# Patient Record
Sex: FEMALE | Race: ASIAN | Employment: FULL TIME | URBAN - METROPOLITAN AREA
[De-identification: names, ages, dates, MRNs, and addresses within clinical notes are randomized per-mention and may not be internally consistent; named-entity substitution may affect disease eponyms.]

---

## 2017-02-08 ENCOUNTER — OFFICE VISIT (OUTPATIENT)
Dept: INTERNAL MEDICINE CLINIC | Facility: CLINIC | Age: 32
End: 2017-02-08

## 2017-02-08 VITALS
HEART RATE: 74 BPM | DIASTOLIC BLOOD PRESSURE: 72 MMHG | TEMPERATURE: 99 F | OXYGEN SATURATION: 99 % | WEIGHT: 101.5 LBS | SYSTOLIC BLOOD PRESSURE: 122 MMHG | BODY MASS INDEX: 20 KG/M2

## 2017-02-08 DIAGNOSIS — I10 BENIGN ESSENTIAL HYPERTENSION: Primary | ICD-10-CM

## 2017-02-08 DIAGNOSIS — R00.2 PALPITATIONS: ICD-10-CM

## 2017-02-08 DIAGNOSIS — R51.9 ACUTE NONINTRACTABLE HEADACHE, UNSPECIFIED HEADACHE TYPE: ICD-10-CM

## 2017-02-08 PROCEDURE — 99214 OFFICE O/P EST MOD 30 MIN: CPT | Performed by: FAMILY MEDICINE

## 2017-02-08 NOTE — PROGRESS NOTES
HPI:    Patient ID: Andrea Ham is a 27year old female.     HPI  Having flareup of her migraine HA she believes    Most of these HA are prementrual and in the temples but this is R sided above the R eye area   4d now   Throbbing    BP has been up as normal reflexes. No cranial nerve deficit. She exhibits normal muscle tone. Coordination normal.   Skin: She is not diaphoretic. Psychiatric: She has a normal mood and affect. Vitals reviewed.              ASSESSMENT/PLAN:   Benign essential hypertensio

## 2017-03-21 RX ORDER — HYDROCHLOROTHIAZIDE 12.5 MG/1
TABLET ORAL
Qty: 30 TABLET | Refills: 2 | Status: SHIPPED | OUTPATIENT
Start: 2017-03-21 | End: 2017-05-28

## 2017-05-30 RX ORDER — HYDROCHLOROTHIAZIDE 12.5 MG/1
TABLET ORAL
Qty: 90 TABLET | Refills: 2 | Status: SHIPPED | OUTPATIENT
Start: 2017-05-30 | End: 2017-09-28

## 2017-08-05 RX ORDER — HYDROCHLOROTHIAZIDE 12.5 MG/1
TABLET ORAL
Qty: 30 TABLET | Refills: 0 | Status: SHIPPED | OUTPATIENT
Start: 2017-08-05 | End: 2017-08-14

## 2017-08-14 ENCOUNTER — OFFICE VISIT (OUTPATIENT)
Dept: INTERNAL MEDICINE CLINIC | Facility: CLINIC | Age: 32
End: 2017-08-14

## 2017-08-14 VITALS
OXYGEN SATURATION: 99 % | DIASTOLIC BLOOD PRESSURE: 90 MMHG | BODY MASS INDEX: 20.78 KG/M2 | HEART RATE: 64 BPM | TEMPERATURE: 98 F | HEIGHT: 58 IN | WEIGHT: 99 LBS | SYSTOLIC BLOOD PRESSURE: 144 MMHG

## 2017-08-14 DIAGNOSIS — Z00.00 LABORATORY EXAM ORDERED AS PART OF ROUTINE GENERAL MEDICAL EXAMINATION: ICD-10-CM

## 2017-08-14 DIAGNOSIS — I10 BENIGN ESSENTIAL HYPERTENSION: Primary | ICD-10-CM

## 2017-08-14 DIAGNOSIS — F41.9 ANXIETY: ICD-10-CM

## 2017-08-14 DIAGNOSIS — R00.2 PALPITATIONS: ICD-10-CM

## 2017-08-14 PROCEDURE — 99214 OFFICE O/P EST MOD 30 MIN: CPT | Performed by: PHYSICIAN ASSISTANT

## 2017-08-14 RX ORDER — METOPROLOL SUCCINATE 25 MG/1
25 TABLET, EXTENDED RELEASE ORAL DAILY
Qty: 30 TABLET | Refills: 0 | Status: SHIPPED | OUTPATIENT
Start: 2017-08-14 | End: 2017-09-21

## 2017-08-14 NOTE — PROGRESS NOTES
Jose F Hernandez is a 27year old female. HPI:   Patient presents for f/u of HTN. Compliant with meds, no SEs.  C/o palpitations 1-2 times a month. Seems to correlate with stress and only occurs at night.   Gets anxious when this happens and then fee status: Former Smoker                                                              Packs/day: 0.10      Years: 3.00         Types: Cigarettes  Comment: 2-3 cigarettes when out on weekends  Alcohol use:  Yes              Comment: occ wine        Family Histo counseling at this time, will cont to monitor. The patient indicates understanding of these issues and agrees to the plan. The patient is asked to return in 1 month for BP check.

## 2017-08-14 NOTE — PATIENT INSTRUCTIONS
High Blood Pressure:  - continue hydrochlorothiazide 12.5 mg - 1 tablet daily  - start metoprolol 15 mg - 1 tablet daily    Please do your lab work ASAP. Remember to fast for 10-12 hours but drink plenty of water.     Regular meals throughout the day focus

## 2017-08-15 RX ORDER — METOPROLOL SUCCINATE 25 MG/1
TABLET, EXTENDED RELEASE ORAL
Qty: 90 TABLET | Refills: 0 | OUTPATIENT
Start: 2017-08-15

## 2017-09-26 RX ORDER — METOPROLOL SUCCINATE 25 MG/1
TABLET, EXTENDED RELEASE ORAL
Qty: 30 TABLET | Refills: 0 | Status: SHIPPED | OUTPATIENT
Start: 2017-09-26 | End: 2017-09-26

## 2017-09-26 RX ORDER — METOPROLOL SUCCINATE 25 MG/1
TABLET, EXTENDED RELEASE ORAL
Qty: 30 TABLET | Refills: 0 | Status: SHIPPED | OUTPATIENT
Start: 2017-09-26 | End: 2017-09-28

## 2017-09-28 ENCOUNTER — OFFICE VISIT (OUTPATIENT)
Dept: INTERNAL MEDICINE CLINIC | Facility: CLINIC | Age: 32
End: 2017-09-28

## 2017-09-28 ENCOUNTER — LAB ENCOUNTER (OUTPATIENT)
Dept: LAB | Age: 32
End: 2017-09-28
Attending: PHYSICIAN ASSISTANT
Payer: COMMERCIAL

## 2017-09-28 VITALS
TEMPERATURE: 99 F | SYSTOLIC BLOOD PRESSURE: 110 MMHG | HEIGHT: 58 IN | HEART RATE: 66 BPM | OXYGEN SATURATION: 97 % | RESPIRATION RATE: 15 BRPM | BODY MASS INDEX: 20.78 KG/M2 | DIASTOLIC BLOOD PRESSURE: 66 MMHG | WEIGHT: 99 LBS

## 2017-09-28 DIAGNOSIS — R00.2 PALPITATIONS: ICD-10-CM

## 2017-09-28 DIAGNOSIS — I10 BENIGN ESSENTIAL HYPERTENSION: ICD-10-CM

## 2017-09-28 DIAGNOSIS — Z00.00 LABORATORY EXAM ORDERED AS PART OF ROUTINE GENERAL MEDICAL EXAMINATION: ICD-10-CM

## 2017-09-28 DIAGNOSIS — Z00.00 ANNUAL PHYSICAL EXAM: Primary | ICD-10-CM

## 2017-09-28 LAB
BASOPHILS # BLD AUTO: 0.03 X10(3) UL (ref 0–0.1)
BASOPHILS NFR BLD AUTO: 0.5 %
BUN BLD-MCNC: 11 MG/DL (ref 8–20)
CALCIUM BLD-MCNC: 8.8 MG/DL (ref 8.3–10.3)
CHLORIDE: 104 MMOL/L (ref 101–111)
CHOLEST SMN-MCNC: 186 MG/DL (ref ?–200)
CO2: 25 MMOL/L (ref 22–32)
CREAT BLD-MCNC: 0.83 MG/DL (ref 0.55–1.02)
EOSINOPHIL # BLD AUTO: 0.1 X10(3) UL (ref 0–0.3)
EOSINOPHIL NFR BLD AUTO: 1.6 %
ERYTHROCYTE [DISTWIDTH] IN BLOOD BY AUTOMATED COUNT: 12.2 % (ref 11.5–16)
EST. AVERAGE GLUCOSE BLD GHB EST-MCNC: 100 MG/DL (ref 68–126)
GLUCOSE BLD-MCNC: 81 MG/DL (ref 70–99)
HBA1C MFR BLD HPLC: 5.1 % (ref ?–5.7)
HCT VFR BLD AUTO: 41.1 % (ref 34–50)
HDLC SERPL-MCNC: 100 MG/DL (ref 45–?)
HDLC SERPL: 1.86 {RATIO} (ref ?–4.44)
HGB BLD-MCNC: 13.9 G/DL (ref 12–16)
IMMATURE GRANULOCYTE COUNT: 0.01 X10(3) UL (ref 0–1)
IMMATURE GRANULOCYTE RATIO %: 0.2 %
LDLC SERPL CALC-MCNC: 79 MG/DL (ref ?–130)
LDLC SERPL-MCNC: 7 MG/DL (ref 5–40)
LYMPHOCYTES # BLD AUTO: 2.59 X10(3) UL (ref 0.9–4)
LYMPHOCYTES NFR BLD AUTO: 42.2 %
MCH RBC QN AUTO: 32.6 PG (ref 27–33.2)
MCHC RBC AUTO-ENTMCNC: 33.8 G/DL (ref 31–37)
MCV RBC AUTO: 96.3 FL (ref 81–100)
MONOCYTES # BLD AUTO: 0.56 X10(3) UL (ref 0.1–0.6)
MONOCYTES NFR BLD AUTO: 9.1 %
NEUTROPHIL ABS PRELIM: 2.85 X10 (3) UL (ref 1.3–6.7)
NEUTROPHILS # BLD AUTO: 2.85 X10(3) UL (ref 1.3–6.7)
NEUTROPHILS NFR BLD AUTO: 46.4 %
NONHDLC SERPL-MCNC: 86 MG/DL (ref ?–130)
PLATELET # BLD AUTO: 276 10(3)UL (ref 150–450)
POTASSIUM SERPL-SCNC: 3.9 MMOL/L (ref 3.6–5.1)
RBC # BLD AUTO: 4.27 X10(6)UL (ref 3.8–5.1)
RED CELL DISTRIBUTION WIDTH-SD: 43.2 FL (ref 35.1–46.3)
SODIUM SERPL-SCNC: 137 MMOL/L (ref 136–144)
TRIGLYCERIDES: 35 MG/DL (ref ?–150)
TSI SER-ACNC: 1.11 MIU/ML (ref 0.35–5.5)
WBC # BLD AUTO: 6.1 X10(3) UL (ref 4–13)

## 2017-09-28 PROCEDURE — 99213 OFFICE O/P EST LOW 20 MIN: CPT | Performed by: PHYSICIAN ASSISTANT

## 2017-09-28 PROCEDURE — 85025 COMPLETE CBC W/AUTO DIFF WBC: CPT

## 2017-09-28 PROCEDURE — 80048 BASIC METABOLIC PNL TOTAL CA: CPT

## 2017-09-28 PROCEDURE — 83036 HEMOGLOBIN GLYCOSYLATED A1C: CPT

## 2017-09-28 PROCEDURE — 80061 LIPID PANEL: CPT

## 2017-09-28 PROCEDURE — 84443 ASSAY THYROID STIM HORMONE: CPT

## 2017-09-28 PROCEDURE — 99395 PREV VISIT EST AGE 18-39: CPT | Performed by: PHYSICIAN ASSISTANT

## 2017-09-28 PROCEDURE — 36415 COLL VENOUS BLD VENIPUNCTURE: CPT

## 2017-09-28 RX ORDER — HYDROCHLOROTHIAZIDE 12.5 MG/1
12.5 TABLET ORAL
Qty: 90 TABLET | Refills: 3 | Status: SHIPPED | OUTPATIENT
Start: 2017-09-28 | End: 2018-04-26 | Stop reason: ALTCHOICE

## 2017-09-28 NOTE — PROGRESS NOTES
José Luis Benjamin is a 27year old female who presents for a complete physical exam.   HPI:   Pt presents for annual wellness screening AND f/u of HTN. Ran out of metoprolol 3 days ago. Home BP readings in the 110s/60-70s.   Denies headaches, dizziness, L History reviewed. No pertinent surgical history.    Family History   Problem Relation Age of Onset   • Other Father      age 46   • Hypertension Father    • Lipids Mother    • Psychiatric Mother      anxiety   • Hypertension Mother    • Other Mother clear, TMs clear and flat, MMM, OP clear, turbinates normal  NECK: supple, no lymphadenopathy, no thyromegaly  LUNGS: regular breathing rate and effort, clear to auscultation bilaterally  CARDIO: RRR, normal S1 & S2, no murmur  GI: soft, non-tender, non-di

## 2017-09-28 NOTE — PATIENT INSTRUCTIONS
Blood Pressure:  - continue hydrochlorothiazide 12.5 mg daily    Start an over the counter calcium (1,000-1,200 mg) and vit D (1,000-2,000 IU) supplement daily. Please see Felisha Nicholson once you are trying to conceive to change your BP medication.

## 2017-11-20 ENCOUNTER — CHARTING TRANS (OUTPATIENT)
Dept: OTHER | Age: 32
End: 2017-11-20

## 2017-11-22 ENCOUNTER — CHARTING TRANS (OUTPATIENT)
Dept: OTHER | Age: 32
End: 2017-11-22

## 2018-04-24 ENCOUNTER — APPOINTMENT (OUTPATIENT)
Dept: LAB | Age: 33
End: 2018-04-24
Attending: OBSTETRICS & GYNECOLOGY
Payer: COMMERCIAL

## 2018-04-24 ENCOUNTER — OFFICE VISIT (OUTPATIENT)
Dept: OBGYN CLINIC | Facility: CLINIC | Age: 33
End: 2018-04-24

## 2018-04-24 VITALS
WEIGHT: 105 LBS | HEART RATE: 78 BPM | SYSTOLIC BLOOD PRESSURE: 135 MMHG | BODY MASS INDEX: 22.04 KG/M2 | DIASTOLIC BLOOD PRESSURE: 88 MMHG | HEIGHT: 58 IN

## 2018-04-24 DIAGNOSIS — Z12.4 SCREENING FOR MALIGNANT NEOPLASM OF CERVIX: ICD-10-CM

## 2018-04-24 DIAGNOSIS — I10 BENIGN ESSENTIAL HTN: ICD-10-CM

## 2018-04-24 DIAGNOSIS — Z31.69 ENCOUNTER FOR PRECONCEPTION CONSULTATION: ICD-10-CM

## 2018-04-24 DIAGNOSIS — Z01.419 ENCOUNTER FOR GYNECOLOGICAL EXAMINATION WITHOUT ABNORMAL FINDING: Primary | ICD-10-CM

## 2018-04-24 PROCEDURE — 86762 RUBELLA ANTIBODY: CPT | Performed by: OBSTETRICS & GYNECOLOGY

## 2018-04-24 PROCEDURE — 99385 PREV VISIT NEW AGE 18-39: CPT | Performed by: OBSTETRICS & GYNECOLOGY

## 2018-04-24 PROCEDURE — 36415 COLL VENOUS BLD VENIPUNCTURE: CPT | Performed by: OBSTETRICS & GYNECOLOGY

## 2018-04-24 PROCEDURE — 99212 OFFICE O/P EST SF 10 MIN: CPT | Performed by: OBSTETRICS & GYNECOLOGY

## 2018-04-24 RX ORDER — PRENATAL VIT/IRON FUM/FOLIC AC 27 MG-1 MG
TABLET ORAL
COMMUNITY

## 2018-04-24 NOTE — PROGRESS NOTES
HPI:    Patient ID: Halie Shaw is a 34year old female. HPI Nulligravida with menses q 28d / 7d / painful x 3-4 days and withdrawal for [facility]. Stopped OC in Dec 2015 with HTN diagnosis. Tried ACE inhibitor with cough. Then Metoprolol and now only 12. 5 IBUPROFEN 1 tablet by mouth as needed.  Disp:  Rfl:      Allergies:  Calamari Oil [Squid*        Comment:rash  Lisinopril              Coughing  Losartan                Coughing   PHYSICAL EXAM:   Physical Exam   Constitutional: She appears well-developed Imaging & Referrals:  None       #[id number]

## 2018-04-25 ENCOUNTER — TELEPHONE (OUTPATIENT)
Dept: INTERNAL MEDICINE CLINIC | Facility: CLINIC | Age: 33
End: 2018-04-25

## 2018-04-26 RX ORDER — LABETALOL 100 MG/1
TABLET, FILM COATED ORAL
Qty: 180 TABLET | Refills: 0 | OUTPATIENT
Start: 2018-04-26

## 2018-04-26 RX ORDER — LABETALOL 100 MG/1
100 TABLET, FILM COATED ORAL 2 TIMES DAILY
Qty: 60 TABLET | Refills: 0 | Status: SHIPPED | OUTPATIENT
Start: 2018-04-26 | End: 2018-06-18

## 2018-05-17 ENCOUNTER — TELEPHONE (OUTPATIENT)
Dept: INTERNAL MEDICINE CLINIC | Facility: CLINIC | Age: 33
End: 2018-05-17

## 2018-06-11 ENCOUNTER — TELEPHONE (OUTPATIENT)
Dept: OBGYN CLINIC | Facility: CLINIC | Age: 33
End: 2018-06-11

## 2018-06-11 NOTE — TELEPHONE ENCOUNTER
Pt confirms +HPT and LMP 5/8. Pt is taking a PNV. She is aware she will see all 6 MDs and the first appt is with a nurse. OBN scheduled, pt will arrive ten min early for uhcg.  Pt had several questions regarding working out and other limitations during preg

## 2018-06-18 ENCOUNTER — OFFICE VISIT (OUTPATIENT)
Dept: INTERNAL MEDICINE CLINIC | Facility: CLINIC | Age: 33
End: 2018-06-18

## 2018-06-18 ENCOUNTER — TELEPHONE (OUTPATIENT)
Dept: OBGYN CLINIC | Facility: CLINIC | Age: 33
End: 2018-06-18

## 2018-06-18 VITALS
HEART RATE: 87 BPM | BODY MASS INDEX: 21.78 KG/M2 | DIASTOLIC BLOOD PRESSURE: 68 MMHG | HEIGHT: 58 IN | OXYGEN SATURATION: 99 % | TEMPERATURE: 99 F | SYSTOLIC BLOOD PRESSURE: 100 MMHG | WEIGHT: 103.75 LBS | RESPIRATION RATE: 16 BRPM

## 2018-06-18 DIAGNOSIS — J01.90 ACUTE SINUSITIS, RECURRENCE NOT SPECIFIED, UNSPECIFIED LOCATION: ICD-10-CM

## 2018-06-18 DIAGNOSIS — R42 LIGHTHEADEDNESS: ICD-10-CM

## 2018-06-18 DIAGNOSIS — I10 BENIGN ESSENTIAL HYPERTENSION: ICD-10-CM

## 2018-06-18 DIAGNOSIS — Z3A.01 LESS THAN 8 WEEKS GESTATION OF PREGNANCY: ICD-10-CM

## 2018-06-18 DIAGNOSIS — J02.9 PHARYNGITIS, UNSPECIFIED ETIOLOGY: Primary | ICD-10-CM

## 2018-06-18 PROCEDURE — 87880 STREP A ASSAY W/OPTIC: CPT | Performed by: PHYSICIAN ASSISTANT

## 2018-06-18 PROCEDURE — 99214 OFFICE O/P EST MOD 30 MIN: CPT | Performed by: PHYSICIAN ASSISTANT

## 2018-06-18 RX ORDER — LABETALOL 100 MG/1
50 TABLET, FILM COATED ORAL 2 TIMES DAILY
Qty: 90 TABLET | Refills: 0 | Status: SHIPPED | OUTPATIENT
Start: 2018-06-18 | End: 2018-09-09

## 2018-06-18 NOTE — PROGRESS NOTES
HPI:  Leighann Whyte is a 34year old female who presents for URI symptoms x 2 days. Notes that she is currently pregnant (5w6d based on LMP). C/o body aches, sore throat, ear pain, nasal congestion and drainage, headaches.   Denies fever when out on weekends  Alcohol use:  Yes              Comment: occ wine       REVIEW OF SYSTEMS:  GENERAL: feels well otherwise  SKIN: no rashes  HEENT: per HPI  LUNGS: per HPI  CARDIOVASCULAR: denies chest pain  GI: per HPI  : denies dysuria  NEURO: denie

## 2018-06-18 NOTE — TELEPHONE ENCOUNTER
LMP: 5/8, Pt states that she was around her niece who was sick, now has swollen tonsils, congested and unsure what she can take. NURSE ED: unable to make appt due to insurance, states that new ins will become effective 7/1 but schedule is not open.

## 2018-06-18 NOTE — PATIENT INSTRUCTIONS
Sore Throat / Sinus Symptoms:  - may take tylenol (acetaminophen) 1,000 mg every 8 hours as needed (do not exceed 3,000 mg daily)  - salt water gargles, throat lozenges, chloraseptic spray as needed  - saline spray (2 sprays in each nostril several times a

## 2018-07-02 ENCOUNTER — APPOINTMENT (OUTPATIENT)
Dept: LAB | Facility: HOSPITAL | Age: 33
End: 2018-07-02
Attending: OBSTETRICS & GYNECOLOGY
Payer: COMMERCIAL

## 2018-07-02 ENCOUNTER — NURSE ONLY (OUTPATIENT)
Dept: OBGYN CLINIC | Facility: CLINIC | Age: 33
End: 2018-07-02

## 2018-07-02 ENCOUNTER — TELEPHONE (OUTPATIENT)
Dept: OBGYN CLINIC | Facility: CLINIC | Age: 33
End: 2018-07-02

## 2018-07-02 VITALS — HEIGHT: 59.8 IN | WEIGHT: 99.19 LBS | BODY MASS INDEX: 19.47 KG/M2

## 2018-07-02 DIAGNOSIS — Z36.9 FIRST TRIMESTER SCREENING: Primary | ICD-10-CM

## 2018-07-02 DIAGNOSIS — Z34.01 ENCOUNTER FOR SUPERVISION OF NORMAL FIRST PREGNANCY IN FIRST TRIMESTER: Primary | ICD-10-CM

## 2018-07-02 DIAGNOSIS — Z34.01 ENCOUNTER FOR SUPERVISION OF NORMAL FIRST PREGNANCY IN FIRST TRIMESTER: ICD-10-CM

## 2018-07-02 LAB
ALBUMIN SERPL BCP-MCNC: 4.4 G/DL (ref 3.5–4.8)
ALBUMIN/GLOB SERPL: 1.3 {RATIO} (ref 1–2)
ALP SERPL-CCNC: 43 U/L (ref 32–100)
ALT SERPL-CCNC: 20 U/L (ref 14–54)
ANION GAP SERPL CALC-SCNC: 9 MMOL/L (ref 0–18)
ANTIBODY SCREEN: NEGATIVE
AST SERPL-CCNC: 25 U/L (ref 15–41)
BASOPHILS # BLD: 0.1 K/UL (ref 0–0.2)
BASOPHILS NFR BLD: 1 %
BILIRUB SERPL-MCNC: 1 MG/DL (ref 0.3–1.2)
BUN SERPL-MCNC: 11 MG/DL (ref 8–20)
BUN/CREAT SERPL: 15.3 (ref 10–20)
CALCIUM SERPL-MCNC: 9.8 MG/DL (ref 8.5–10.5)
CHLORIDE SERPL-SCNC: 101 MMOL/L (ref 95–110)
CO2 SERPL-SCNC: 26 MMOL/L (ref 22–32)
CONTROL LINE PRESENT WITH A CLEAR BACKGROUND (YES/NO): YES YES/NO
CREAT SERPL-MCNC: 0.72 MG/DL (ref 0.5–1.5)
EOSINOPHIL # BLD: 0.1 K/UL (ref 0–0.7)
EOSINOPHIL NFR BLD: 1 %
ERYTHROCYTE [DISTWIDTH] IN BLOOD BY AUTOMATED COUNT: 12.3 % (ref 11–15)
GLOBULIN PLAS-MCNC: 3.5 G/DL (ref 2.5–3.7)
GLUCOSE SERPL-MCNC: 92 MG/DL (ref 70–99)
HCT VFR BLD AUTO: 38.6 % (ref 35–48)
HGB BLD-MCNC: 13 G/DL (ref 12–16)
KIT LOT #: NORMAL NUMERIC
LYMPHOCYTES # BLD: 2.4 K/UL (ref 1–4)
LYMPHOCYTES NFR BLD: 30 %
MCH RBC QN AUTO: 32.3 PG (ref 27–32)
MCHC RBC AUTO-ENTMCNC: 33.7 G/DL (ref 32–37)
MCV RBC AUTO: 95.8 FL (ref 80–100)
MONOCYTES # BLD: 0.5 K/UL (ref 0–1)
MONOCYTES NFR BLD: 6 %
NEUTROPHILS # BLD AUTO: 5 K/UL (ref 1.8–7.7)
NEUTROPHILS NFR BLD: 62 %
OSMOLALITY UR CALC.SUM OF ELEC: 281 MOSM/KG (ref 275–295)
PATIENT FASTING: YES
PLATELET # BLD AUTO: 343 K/UL (ref 140–400)
PMV BLD AUTO: 7.9 FL (ref 7.4–10.3)
POTASSIUM SERPL-SCNC: 3.8 MMOL/L (ref 3.3–5.1)
PREGNANCY TEST, URINE: POSITIVE
PROT SERPL-MCNC: 7.9 G/DL (ref 5.9–8.4)
RBC # BLD AUTO: 4.03 M/UL (ref 3.7–5.4)
RH BLOOD TYPE: POSITIVE
RUBV IGG SER-ACNC: 31.1 IU/ML
SODIUM SERPL-SCNC: 136 MMOL/L (ref 136–144)
WBC # BLD AUTO: 8 K/UL (ref 4–11)

## 2018-07-02 PROCEDURE — 36415 COLL VENOUS BLD VENIPUNCTURE: CPT

## 2018-07-02 PROCEDURE — 86901 BLOOD TYPING SEROLOGIC RH(D): CPT

## 2018-07-02 PROCEDURE — 85025 COMPLETE CBC W/AUTO DIFF WBC: CPT | Performed by: OBSTETRICS & GYNECOLOGY

## 2018-07-02 PROCEDURE — 81025 URINE PREGNANCY TEST: CPT | Performed by: OBSTETRICS & GYNECOLOGY

## 2018-07-02 PROCEDURE — 86900 BLOOD TYPING SEROLOGIC ABO: CPT

## 2018-07-02 PROCEDURE — 86850 RBC ANTIBODY SCREEN: CPT

## 2018-07-02 PROCEDURE — 87389 HIV-1 AG W/HIV-1&-2 AB AG IA: CPT | Performed by: OBSTETRICS & GYNECOLOGY

## 2018-07-02 PROCEDURE — 87086 URINE CULTURE/COLONY COUNT: CPT | Performed by: OBSTETRICS & GYNECOLOGY

## 2018-07-02 PROCEDURE — 86762 RUBELLA ANTIBODY: CPT | Performed by: OBSTETRICS & GYNECOLOGY

## 2018-07-02 PROCEDURE — 86780 TREPONEMA PALLIDUM: CPT | Performed by: OBSTETRICS & GYNECOLOGY

## 2018-07-02 PROCEDURE — 87340 HEPATITIS B SURFACE AG IA: CPT | Performed by: OBSTETRICS & GYNECOLOGY

## 2018-07-02 PROCEDURE — 80053 COMPREHEN METABOLIC PANEL: CPT | Performed by: OBSTETRICS & GYNECOLOGY

## 2018-07-02 NOTE — PROGRESS NOTES
Pt seen for OBN appt today with no complaints. Normal PN labs ordered plus 24 hour urine protein and CMP. Pt advised all labs must be completed and resulted prior to MD appt. Pt scheduled for new ob appt with MISAEL on 7/16.      Partner's name is Jaylin Zuñiga Down Syndrome No    Hemophilia No    Curtice's Chorea No    Mental Retardation/Autism No    Muscular Dystrophy No    Neural tube defects No    Sickle Cell Disease or trait No    Fer-Sachs Disease No    Thalassemia No    Other inherited genetic or

## 2018-07-03 ENCOUNTER — TELEPHONE (OUTPATIENT)
Dept: OBGYN CLINIC | Facility: CLINIC | Age: 33
End: 2018-07-03

## 2018-07-03 LAB
HBV SURFACE AG SERPL QL IA: NONREACTIVE
HIV1+2 AB SERPL QL IA: NONREACTIVE
T PALLIDUM AB SER QL: NEGATIVE

## 2018-07-04 ENCOUNTER — APPOINTMENT (OUTPATIENT)
Dept: ULTRASOUND IMAGING | Facility: HOSPITAL | Age: 33
End: 2018-07-04
Attending: EMERGENCY MEDICINE
Payer: COMMERCIAL

## 2018-07-04 ENCOUNTER — TELEPHONE (OUTPATIENT)
Dept: OBGYN CLINIC | Facility: CLINIC | Age: 33
End: 2018-07-04

## 2018-07-04 ENCOUNTER — HOSPITAL ENCOUNTER (EMERGENCY)
Facility: HOSPITAL | Age: 33
Discharge: HOME | End: 2018-07-04
Attending: EMERGENCY MEDICINE
Payer: COMMERCIAL

## 2018-07-04 VITALS
HEART RATE: 79 BPM | OXYGEN SATURATION: 100 % | RESPIRATION RATE: 16 BRPM | DIASTOLIC BLOOD PRESSURE: 75 MMHG | BODY MASS INDEX: 20.78 KG/M2 | TEMPERATURE: 99 F | WEIGHT: 99 LBS | HEIGHT: 58 IN | SYSTOLIC BLOOD PRESSURE: 130 MMHG

## 2018-07-04 DIAGNOSIS — O03.4 INCOMPLETE SPONTANEOUS ABORTION: Primary | ICD-10-CM

## 2018-07-04 LAB
B-HCG SERPL-ACNC: 7935 MIU/ML
BACTERIA UR QL AUTO: NEGATIVE /HPF
BASOPHILS # BLD: 0 K/UL (ref 0–0.2)
BASOPHILS NFR BLD: 0 %
BILIRUB UR QL: NEGATIVE
CLARITY UR: CLEAR
COLOR UR: YELLOW
EOSINOPHIL # BLD: 0.1 K/UL (ref 0–0.7)
EOSINOPHIL NFR BLD: 1 %
ERYTHROCYTE [DISTWIDTH] IN BLOOD BY AUTOMATED COUNT: 12.4 % (ref 11–15)
GLUCOSE UR-MCNC: NEGATIVE MG/DL
HCT VFR BLD AUTO: 37.8 % (ref 35–48)
HGB BLD-MCNC: 12.6 G/DL (ref 12–16)
KETONES UR-MCNC: NEGATIVE MG/DL
LEUKOCYTE ESTERASE UR QL STRIP.AUTO: NEGATIVE
LYMPHOCYTES # BLD: 2.2 K/UL (ref 1–4)
LYMPHOCYTES NFR BLD: 20 %
MCH RBC QN AUTO: 32.1 PG (ref 27–32)
MCHC RBC AUTO-ENTMCNC: 33.3 G/DL (ref 32–37)
MCV RBC AUTO: 96.2 FL (ref 80–100)
MONOCYTES # BLD: 0.7 K/UL (ref 0–1)
MONOCYTES NFR BLD: 7 %
NEUTROPHILS # BLD AUTO: 7.9 K/UL (ref 1.8–7.7)
NEUTROPHILS NFR BLD: 72 %
NITRITE UR QL STRIP.AUTO: NEGATIVE
PH UR: 6 [PH] (ref 5–8)
PLATELET # BLD AUTO: 322 K/UL (ref 140–400)
PMV BLD AUTO: 7.6 FL (ref 7.4–10.3)
PROT UR-MCNC: NEGATIVE MG/DL
RBC # BLD AUTO: 3.93 M/UL (ref 3.7–5.4)
RBC #/AREA URNS AUTO: 1 /HPF
SP GR UR STRIP: 1.01 (ref 1–1.03)
UROBILINOGEN UR STRIP-ACNC: <2
VIT C UR-MCNC: NEGATIVE MG/DL
WBC # BLD AUTO: 11.1 K/UL (ref 4–11)
WBC #/AREA URNS AUTO: <1 /HPF

## 2018-07-04 PROCEDURE — 99284 EMERGENCY DEPT VISIT MOD MDM: CPT

## 2018-07-04 PROCEDURE — 85025 COMPLETE CBC W/AUTO DIFF WBC: CPT | Performed by: EMERGENCY MEDICINE

## 2018-07-04 PROCEDURE — 76801 OB US < 14 WKS SINGLE FETUS: CPT | Performed by: EMERGENCY MEDICINE

## 2018-07-04 PROCEDURE — 84702 CHORIONIC GONADOTROPIN TEST: CPT | Performed by: EMERGENCY MEDICINE

## 2018-07-04 PROCEDURE — 36415 COLL VENOUS BLD VENIPUNCTURE: CPT

## 2018-07-04 PROCEDURE — 81001 URINALYSIS AUTO W/SCOPE: CPT | Performed by: EMERGENCY MEDICINE

## 2018-07-04 NOTE — ED NOTES
Pt reports to ED with complaints of lower midline abd pain radiating to the RLQ and vaginal bleeding/passing clots, pt is 8wks preg.  Pt states that yesterday morning she spotted very lightly, throughtout the day it progressed to heavier spotting and awoke

## 2018-07-04 NOTE — ED PROVIDER NOTES
Patient Seen in: [facility] AND [facility] Emergency Department    History   Patient presents with:  Vaginal Bleeding    Stated Complaint: blood clots     HPI    Patient is a 76-year-old  approximately 8 week pregnant female who presents with vaginal bleedi regular rhythm, normal heart sounds and intact distal pulses. No murmur heard. Pulmonary/Chest: Effort normal and breath sounds normal.   Abdominal: Soft. Bowel sounds are normal. She exhibits no distension and no mass.  There is tenderness (Mild suprap significant Doppler hypervascularity. No intrauterine gestational sac, yolk sac, or fetal pole is identified. Findings may reflect an early (less than 6 weeks) intrauterine pregnancy, spontaneous , or less likely nonvisualized ectopic pregnancy.  Co

## 2018-07-04 NOTE — TELEPHONE ENCOUNTER
Paged on call by Dr Patito Roblero in ED. Patient evaluated there. US shows no sac or adnexal mass. She does have a small complex cyst on right w/ only a trace free fluid. I discussed exam with Dr Patito Roblero and OK to discharge home with instructions.  I'll ask office to

## 2018-07-04 NOTE — TELEPHONE ENCOUNTER
Paged on call with complaint of bleeding and pain. Bleeding was brown and spotty until last trip to bathroom at which time she saw clots < dime size but some increased red flow. Pain is suprapubic like menstrual cramps. She is Rh positive.  I discussed pain

## 2018-07-05 ENCOUNTER — TELEPHONE (OUTPATIENT)
Dept: OBGYN CLINIC | Facility: CLINIC | Age: 33
End: 2018-07-05

## 2018-07-05 ENCOUNTER — HOSPITAL ENCOUNTER (EMERGENCY)
Facility: HOSPITAL | Age: 33
Discharge: HOME | End: 2018-07-05
Attending: EMERGENCY MEDICINE
Payer: COMMERCIAL

## 2018-07-05 VITALS
DIASTOLIC BLOOD PRESSURE: 68 MMHG | BODY MASS INDEX: 21 KG/M2 | HEART RATE: 64 BPM | WEIGHT: 99 LBS | RESPIRATION RATE: 16 BRPM | OXYGEN SATURATION: 99 % | SYSTOLIC BLOOD PRESSURE: 100 MMHG | TEMPERATURE: 99 F

## 2018-07-05 DIAGNOSIS — Z3A.08 8 WEEKS GESTATION OF PREGNANCY: Primary | ICD-10-CM

## 2018-07-05 DIAGNOSIS — O03.9 MISCARRIAGE: Primary | ICD-10-CM

## 2018-07-05 LAB
ANION GAP SERPL CALC-SCNC: 6 MMOL/L (ref 0–18)
B-HCG SERPL-ACNC: 2260 MIU/ML
BASOPHILS # BLD: 0.1 K/UL (ref 0–0.2)
BASOPHILS NFR BLD: 1 %
BUN SERPL-MCNC: 11 MG/DL (ref 8–20)
BUN/CREAT SERPL: 12.4 (ref 10–20)
CALCIUM SERPL-MCNC: 9.4 MG/DL (ref 8.5–10.5)
CHLORIDE SERPL-SCNC: 104 MMOL/L (ref 95–110)
CO2 SERPL-SCNC: 27 MMOL/L (ref 22–32)
CREAT SERPL-MCNC: 0.89 MG/DL (ref 0.5–1.5)
EOSINOPHIL # BLD: 0.1 K/UL (ref 0–0.7)
EOSINOPHIL NFR BLD: 1 %
ERYTHROCYTE [DISTWIDTH] IN BLOOD BY AUTOMATED COUNT: 12.6 % (ref 11–15)
GLUCOSE SERPL-MCNC: 91 MG/DL (ref 70–99)
HCT VFR BLD AUTO: 38.4 % (ref 35–48)
HGB BLD-MCNC: 13 G/DL (ref 12–16)
LYMPHOCYTES # BLD: 1.6 K/UL (ref 1–4)
LYMPHOCYTES NFR BLD: 14 %
MCH RBC QN AUTO: 32.7 PG (ref 27–32)
MCHC RBC AUTO-ENTMCNC: 34 G/DL (ref 32–37)
MCV RBC AUTO: 96.4 FL (ref 80–100)
MONOCYTES # BLD: 0.9 K/UL (ref 0–1)
MONOCYTES NFR BLD: 7 %
NEUTROPHILS # BLD AUTO: 8.9 K/UL (ref 1.8–7.7)
NEUTROPHILS NFR BLD: 77 %
OSMOLALITY UR CALC.SUM OF ELEC: 283 MOSM/KG (ref 275–295)
PLATELET # BLD AUTO: 313 K/UL (ref 140–400)
PMV BLD AUTO: 8.1 FL (ref 7.4–10.3)
POTASSIUM SERPL-SCNC: 4.4 MMOL/L (ref 3.3–5.1)
RBC # BLD AUTO: 3.98 M/UL (ref 3.7–5.4)
RH BLOOD TYPE: POSITIVE
SODIUM SERPL-SCNC: 137 MMOL/L (ref 136–144)
WBC # BLD AUTO: 11.6 K/UL (ref 4–11)

## 2018-07-05 PROCEDURE — 85025 COMPLETE CBC W/AUTO DIFF WBC: CPT | Performed by: EMERGENCY MEDICINE

## 2018-07-05 PROCEDURE — 88305 TISSUE EXAM BY PATHOLOGIST: CPT

## 2018-07-05 PROCEDURE — 86900 BLOOD TYPING SEROLOGIC ABO: CPT | Performed by: EMERGENCY MEDICINE

## 2018-07-05 PROCEDURE — 96360 HYDRATION IV INFUSION INIT: CPT

## 2018-07-05 PROCEDURE — 86901 BLOOD TYPING SEROLOGIC RH(D): CPT | Performed by: EMERGENCY MEDICINE

## 2018-07-05 PROCEDURE — 99285 EMERGENCY DEPT VISIT HI MDM: CPT

## 2018-07-05 PROCEDURE — 80048 BASIC METABOLIC PNL TOTAL CA: CPT | Performed by: EMERGENCY MEDICINE

## 2018-07-05 PROCEDURE — 84702 CHORIONIC GONADOTROPIN TEST: CPT | Performed by: EMERGENCY MEDICINE

## 2018-07-05 RX ORDER — TRAMADOL HYDROCHLORIDE 50 MG/1
TABLET ORAL EVERY 6 HOURS PRN
Qty: 20 TABLET | Refills: 0 | Status: SHIPPED | OUTPATIENT
Start: 2018-07-05 | End: 2018-07-12

## 2018-07-05 NOTE — TELEPHONE ENCOUNTER
PER PT STATE SHE'S IN A LOT OF PAIN / LOWER ABD PAIN / PT STATE SHE NEED AN ORDER FOR LABS / PT STATE SHE'S IN THE LAB WAITING AREA NOW OF THE HOSP / PLS ADV

## 2018-07-05 NOTE — TELEPHONE ENCOUNTER
Pt states that her pain has gotten worse. She rates it a 9/10  Pt states that it is all in her lower abd and radiates to her ovaries. Pt states that she is bleeding and filling a pad every 1 1/2 hour.  Pt states that she has clots the size of a nickel and

## 2018-07-05 NOTE — TELEPHONE ENCOUNTER
MISAEL made aware of the below. MISAEL wanted pt to not eat or drink after 9:00 am today. Informed pt and she stated understanding.

## 2018-07-05 NOTE — TELEPHONE ENCOUNTER
Pt made an appt with MISAEL today. Pt will go this am for the quant hcg, so MISAEL will have results when she sees him this afternoon. Pt states that she has pain at 8 out of 10, but just took Ibuprofen 400mcg,  Pt will take one more 200 mcg Ibuprofen.    Pt

## 2018-07-05 NOTE — ED INITIAL ASSESSMENT (HPI)
Patient here with worsening pain and vaginal bleeding since yesterday. Patient seen yesterday for miscarriage. Sent by Dr. Landy Cartagena. Saturating 1 pad every 1.5 hours.

## 2018-07-05 NOTE — TELEPHONE ENCOUNTER
I spoke with office RN and then charge RN at ED. I want patient to go directly to ED from [facility].

## 2018-07-05 NOTE — ED NOTES
Pt was seen here yesterday and dc'd with inevitable AB. Pt returned today with increased cramping and soaking 1.5 pads in 3 hours. Pt reports passing \"red clots\" unsure if passed any tissue. Color good skin w/d cap refill wnl. Pt reports feeling weak.

## 2018-07-06 ENCOUNTER — TELEPHONE (OUTPATIENT)
Dept: PEDIATRICS CLINIC | Facility: CLINIC | Age: 33
End: 2018-07-06

## 2018-07-06 ENCOUNTER — HOSPITAL ENCOUNTER (EMERGENCY)
Facility: HOSPITAL | Age: 33
Discharge: STILL ADMITTED OR ADMINISTRATIVE | End: 2018-07-06
Payer: COMMERCIAL

## 2018-07-06 NOTE — TELEPHONE ENCOUNTER
Pt went to ER yesterday for pelvic pain and bleeding in early pregnancy. Pt stated that the ER MD did a vaginal exam and pt stated the doctor \"took as much blood out as she could and sent it to pathology\".  Pt wanting to f/u with MISAEL and pt accepted appt

## 2018-07-08 NOTE — ED PROVIDER NOTES
Patient Seen in: [facility] Emergency Department    History   Patient presents with:  Miscarriage    Stated Complaint: worsening pain and bleeding, sent by [facility]    HPI    34year old female  approximately 8 wga who presents for re-evaluation Resp 16   Wt 44.9 kg   LMP 05/08/2018 (Exact Date)   SpO2 99%   BMI 20.69 kg/m²         Physical Exam   Constitutional: She is oriented to person, place, and time. She appears well-developed and well-nourished. She is cooperative. Non-toxic appearance.  No Reviewed   CBC W/ DIFFERENTIAL - Abnormal; Notable for the following:        Result Value    WBC 11.6 (*)     MCH 32.7 (*)     Neutrophil Absolute 8.9 (*)     All other components within normal limits   BASIC METABOLIC PANEL (8) - Normal   CBC WITH DIFFERE Prescribed:  Discharge Medication List as of 7/5/2018  5:02 PM    START taking these medications    TraMADol HCl 50 MG Oral Tab  Take 1-2 tablets ( mg total) by mouth every 6 (six) hours as needed for Pain., Print Script, Disp-20 tablet, R-0

## 2018-07-09 ENCOUNTER — OFFICE VISIT (OUTPATIENT)
Dept: OBGYN CLINIC | Facility: CLINIC | Age: 33
End: 2018-07-09

## 2018-07-09 VITALS
BODY MASS INDEX: 20.24 KG/M2 | DIASTOLIC BLOOD PRESSURE: 82 MMHG | SYSTOLIC BLOOD PRESSURE: 125 MMHG | WEIGHT: 100.38 LBS | HEIGHT: 59 IN | HEART RATE: 67 BPM

## 2018-07-09 DIAGNOSIS — O03.4 INCOMPLETE SPONTANEOUS ABORTION: Primary | ICD-10-CM

## 2018-07-09 PROCEDURE — 99213 OFFICE O/P EST LOW 20 MIN: CPT | Performed by: OBSTETRICS & GYNECOLOGY

## 2018-07-13 ENCOUNTER — APPOINTMENT (OUTPATIENT)
Dept: LAB | Age: 33
End: 2018-07-13
Attending: OBSTETRICS & GYNECOLOGY
Payer: COMMERCIAL

## 2018-07-13 DIAGNOSIS — O03.4 INCOMPLETE SPONTANEOUS ABORTION: ICD-10-CM

## 2018-07-13 LAB — B-HCG SERPL-ACNC: 48.1 MIU/ML

## 2018-07-13 PROCEDURE — 36415 COLL VENOUS BLD VENIPUNCTURE: CPT | Performed by: OBSTETRICS & GYNECOLOGY

## 2018-07-13 PROCEDURE — 84702 CHORIONIC GONADOTROPIN TEST: CPT | Performed by: OBSTETRICS & GYNECOLOGY

## 2018-07-15 ENCOUNTER — TELEPHONE (OUTPATIENT)
Dept: OBGYN CLINIC | Facility: CLINIC | Age: 33
End: 2018-07-15

## 2018-07-16 ENCOUNTER — TELEPHONE (OUTPATIENT)
Dept: OBGYN CLINIC | Facility: CLINIC | Age: 33
End: 2018-07-16

## 2018-07-16 DIAGNOSIS — Z34.91 NORMAL PREGNANCY IN FIRST TRIMESTER: Primary | ICD-10-CM

## 2018-07-16 NOTE — TELEPHONE ENCOUNTER
Please ask our billing department not to charge Antonia Skinner for missed visit on 07-05-18. She was told to go to [address] [place] [address] ED by us. Thanks you.

## 2018-07-16 NOTE — TELEPHONE ENCOUNTER
Lmtcb. Please relay info:    Patient's referral for fts entered. Patient can call maternal fetal medicine [phone] to schedule an appointment. Info routed via [employer].

## 2018-07-16 NOTE — TELEPHONE ENCOUNTER
Informed Marjorie Ybarra from our billing department regarding not to charge pt for missed visit on 7/5/18. She states that she will waive the charge.

## 2018-07-18 NOTE — PROGRESS NOTES
HPI:    Patient ID: Margaret More is a 34year old female. HPI    S/P [facility] [place] 2[place] ED visit  18 for bleeding / passing tissue. US showed no IUP. Path did not show POC. Bleeding has significantly decreased.      Review of Systems Encounter      HCG, Beta Subunit, Quant    Meds This Visit:  No prescriptions requested or ordered in this encounter    Imaging & Referrals:  None       #[id number]

## 2018-07-19 ENCOUNTER — APPOINTMENT (OUTPATIENT)
Dept: LAB | Age: 33
End: 2018-07-19
Attending: OBSTETRICS & GYNECOLOGY
Payer: COMMERCIAL

## 2018-07-19 DIAGNOSIS — O03.4 INCOMPLETE SPONTANEOUS ABORTION: ICD-10-CM

## 2018-07-19 LAB — B-HCG SERPL-ACNC: 9.2 MIU/ML

## 2018-07-19 PROCEDURE — 84702 CHORIONIC GONADOTROPIN TEST: CPT

## 2018-07-19 PROCEDURE — 36415 COLL VENOUS BLD VENIPUNCTURE: CPT

## 2018-07-27 ENCOUNTER — APPOINTMENT (OUTPATIENT)
Dept: LAB | Age: 33
End: 2018-07-27
Attending: OBSTETRICS & GYNECOLOGY
Payer: COMMERCIAL

## 2018-07-27 DIAGNOSIS — O03.4 INCOMPLETE SPONTANEOUS ABORTION: ICD-10-CM

## 2018-07-27 LAB — B-HCG SERPL-ACNC: 2 MIU/ML

## 2018-07-27 PROCEDURE — 84702 CHORIONIC GONADOTROPIN TEST: CPT

## 2018-07-27 PROCEDURE — 36415 COLL VENOUS BLD VENIPUNCTURE: CPT

## 2018-09-10 RX ORDER — LABETALOL 100 MG/1
50 TABLET, FILM COATED ORAL 2 TIMES DAILY
Qty: 30 TABLET | Refills: 0 | Status: SHIPPED | OUTPATIENT
Start: 2018-09-10 | End: 2018-10-16

## 2018-09-10 NOTE — TELEPHONE ENCOUNTER
Refill requested: Labetolol 100 mg    Passed protocol      Last refill: 6/18/18 Labetalol 100 mg #90 NR     Relevant Labs: BMP 7/5/2018   BP Readings from Last 3 Encounters:  07/09/18 : 125/82  07/05/18 : 100/68  07/04/18 : 130/75      Last OV / RTC advise

## 2018-10-17 RX ORDER — LABETALOL 100 MG/1
TABLET, FILM COATED ORAL
Qty: 30 TABLET | Refills: 0 | Status: SHIPPED | OUTPATIENT
Start: 2018-10-17 | End: 2018-12-14

## 2018-10-17 NOTE — TELEPHONE ENCOUNTER
Medication(s) to Refill:   Requested Prescriptions     Pending Prescriptions Disp Refills   • LABETALOL  MG Oral Tab [Pharmacy Med Name: LABETALOL 100MG TABLETS] 30 tablet 0     Sig: TAKE 1/2 TABLET(50 MG) BY MOUTH TWICE DAILY       Last Time [facility]

## 2018-12-14 RX ORDER — LABETALOL 100 MG/1
TABLET, FILM COATED ORAL
Qty: 30 TABLET | Refills: 0 | Status: SHIPPED | OUTPATIENT
Start: 2018-12-14 | End: 2019-01-11

## 2018-12-14 RX ORDER — LABETALOL 100 MG/1
TABLET, FILM COATED ORAL
Qty: 90 TABLET | Refills: 0 | OUTPATIENT
Start: 2018-12-14

## 2018-12-14 NOTE — TELEPHONE ENCOUNTER
Patient passed protocol so a 30 day was given - a [employer] message was sent to patient to contact office to schedule 3 month follow up.

## 2019-01-11 RX ORDER — LABETALOL 100 MG/1
TABLET, FILM COATED ORAL
Qty: 30 TABLET | Refills: 0 | Status: SHIPPED | OUTPATIENT
Start: 2019-01-11 | End: 2019-02-23

## 2019-01-11 NOTE — TELEPHONE ENCOUNTER
Refill requested:   Requested Prescriptions     Pending Prescriptions Disp Refills   • LABETALOL  MG Oral Tab [Pharmacy Med Name: LABETALOL 100MG TABLETS] 30 tablet 0     Sig: TAKE 1/2 TABLET(50 MG) BY MOUTH TWICE DAILY       Failed protocol needs a

## 2019-02-21 ENCOUNTER — TELEPHONE (OUTPATIENT)
Dept: INTERNAL MEDICINE CLINIC | Facility: CLINIC | Age: 34
End: 2019-02-21

## 2019-02-22 ENCOUNTER — OFFICE VISIT (OUTPATIENT)
Dept: INTERNAL MEDICINE CLINIC | Facility: CLINIC | Age: 34
End: 2019-02-22

## 2019-02-22 ENCOUNTER — APPOINTMENT (OUTPATIENT)
Dept: LAB | Age: 34
End: 2019-02-22
Attending: FAMILY MEDICINE
Payer: COMMERCIAL

## 2019-02-22 VITALS
RESPIRATION RATE: 16 BRPM | HEIGHT: 59 IN | HEART RATE: 79 BPM | SYSTOLIC BLOOD PRESSURE: 110 MMHG | DIASTOLIC BLOOD PRESSURE: 70 MMHG | BODY MASS INDEX: 21.17 KG/M2 | WEIGHT: 105 LBS | TEMPERATURE: 99 F

## 2019-02-22 DIAGNOSIS — R00.2 PALPITATION: ICD-10-CM

## 2019-02-22 DIAGNOSIS — Z32.00 POSSIBLE PREGNANCY: ICD-10-CM

## 2019-02-22 DIAGNOSIS — O03.4 RETAINED PRODUCTS OF CONCEPTION FOLLOWING ABORTION: Primary | ICD-10-CM

## 2019-02-22 LAB
ALBUMIN SERPL-MCNC: 4.2 G/DL (ref 3.4–5)
ALBUMIN/GLOB SERPL: 1.1 {RATIO} (ref 1–2)
ALP LIVER SERPL-CCNC: 53 U/L (ref 37–98)
ALT SERPL-CCNC: 20 U/L (ref 13–56)
ANION GAP SERPL CALC-SCNC: 8 MMOL/L (ref 0–18)
AST SERPL-CCNC: 20 U/L (ref 15–37)
BASOPHILS # BLD AUTO: 0.05 X10(3) UL (ref 0–0.2)
BASOPHILS NFR BLD AUTO: 0.6 %
BILIRUB SERPL-MCNC: 0.8 MG/DL (ref 0.1–2)
BUN BLD-MCNC: 16 MG/DL (ref 7–18)
BUN/CREAT SERPL: 20 (ref 10–20)
CALCIUM BLD-MCNC: 8.8 MG/DL (ref 8.5–10.1)
CHLORIDE SERPL-SCNC: 106 MMOL/L (ref 98–107)
CO2 SERPL-SCNC: 25 MMOL/L (ref 21–32)
CONTROL LINE PRESENT WITH A CLEAR BACKGROUND (YES/NO): YES YES/NO
CREAT BLD-MCNC: 0.8 MG/DL (ref 0.55–1.02)
DEPRECATED RDW RBC AUTO: 42.1 FL (ref 35.1–46.3)
EOSINOPHIL # BLD AUTO: 0.1 X10(3) UL (ref 0–0.7)
EOSINOPHIL NFR BLD AUTO: 1.2 %
ERYTHROCYTE [DISTWIDTH] IN BLOOD BY AUTOMATED COUNT: 12.1 % (ref 11–15)
GLOBULIN PLAS-MCNC: 3.7 G/DL (ref 2.8–4.4)
GLUCOSE BLD-MCNC: 87 MG/DL (ref 70–99)
HCT VFR BLD AUTO: 38.4 % (ref 35–48)
HGB BLD-MCNC: 13.4 G/DL (ref 12–16)
IMM GRANULOCYTES # BLD AUTO: 0.03 X10(3) UL (ref 0–1)
IMM GRANULOCYTES NFR BLD: 0.4 %
LYMPHOCYTES # BLD AUTO: 2.02 X10(3) UL (ref 1–4)
LYMPHOCYTES NFR BLD AUTO: 23.6 %
M PROTEIN MFR SERPL ELPH: 7.9 G/DL (ref 6.4–8.2)
MCH RBC QN AUTO: 32.8 PG (ref 26–34)
MCHC RBC AUTO-ENTMCNC: 34.9 G/DL (ref 31–37)
MCV RBC AUTO: 94.1 FL (ref 80–100)
MONOCYTES # BLD AUTO: 0.67 X10(3) UL (ref 0.1–1)
MONOCYTES NFR BLD AUTO: 7.8 %
NEUTROPHILS # BLD AUTO: 5.68 X10 (3) UL (ref 1.5–7.7)
NEUTROPHILS # BLD AUTO: 5.68 X10(3) UL (ref 1.5–7.7)
NEUTROPHILS NFR BLD AUTO: 66.4 %
OSMOLALITY SERPL CALC.SUM OF ELEC: 289 MOSM/KG (ref 275–295)
PLATELET # BLD AUTO: 274 10(3)UL (ref 150–450)
POTASSIUM SERPL-SCNC: 3.8 MMOL/L (ref 3.5–5.1)
PREGNANCY TEST, URINE: NEGATIVE
RBC # BLD AUTO: 4.08 X10(6)UL (ref 3.8–5.3)
SODIUM SERPL-SCNC: 139 MMOL/L (ref 136–145)
TSI SER-ACNC: 1.86 MIU/ML (ref 0.36–3.74)
WBC # BLD AUTO: 8.6 X10(3) UL (ref 4–11)

## 2019-02-22 PROCEDURE — 80053 COMPREHEN METABOLIC PANEL: CPT | Performed by: FAMILY MEDICINE

## 2019-02-22 PROCEDURE — 81025 URINE PREGNANCY TEST: CPT | Performed by: FAMILY MEDICINE

## 2019-02-22 PROCEDURE — 36415 COLL VENOUS BLD VENIPUNCTURE: CPT | Performed by: FAMILY MEDICINE

## 2019-02-22 PROCEDURE — 84443 ASSAY THYROID STIM HORMONE: CPT | Performed by: FAMILY MEDICINE

## 2019-02-22 PROCEDURE — 85025 COMPLETE CBC W/AUTO DIFF WBC: CPT | Performed by: FAMILY MEDICINE

## 2019-02-22 PROCEDURE — 99214 OFFICE O/P EST MOD 30 MIN: CPT | Performed by: FAMILY MEDICINE

## 2019-02-22 NOTE — PROGRESS NOTES
HPI:    Patient ID: Herlinda Wing is a 28year old female.     HPI  Here for heart \"arhythmia\"  Feels it slow down and the feel a jolt afterward   Can get w SOB w it she feels  No CP    Last 2 min perhaps   This is usually at night     Exercises 2x a da METABOLIC PANEL      TSH [402]      Meds This Visit:  Requested Prescriptions      No prescriptions requested or ordered in this encounter       Imaging & Referrals:  None       BE#[id number]

## 2019-02-25 RX ORDER — LABETALOL 100 MG/1
TABLET, FILM COATED ORAL
Qty: 30 TABLET | Refills: 1 | Status: SHIPPED | OUTPATIENT
Start: 2019-02-25 | End: 2019-05-15

## 2019-02-25 NOTE — TELEPHONE ENCOUNTER
Passed Protocol    Medication(s) to Refill:   Requested Prescriptions     Pending Prescriptions Disp Refills   • LABETALOL  MG Oral Tab [Pharmacy Med Name: LABETALOL 100MG TABLETS] 30 tablet 0     Sig: TAKE 1/2 TABLET(50 MG) BY MOUTH TWICE DAILY

## 2019-03-11 ENCOUNTER — OFFICE VISIT (OUTPATIENT)
Dept: INTERNAL MEDICINE CLINIC | Facility: CLINIC | Age: 34
End: 2019-03-11

## 2019-03-11 ENCOUNTER — TELEPHONE (OUTPATIENT)
Dept: INTERNAL MEDICINE CLINIC | Facility: CLINIC | Age: 34
End: 2019-03-11

## 2019-03-11 VITALS
RESPIRATION RATE: 16 BRPM | SYSTOLIC BLOOD PRESSURE: 118 MMHG | HEART RATE: 72 BPM | DIASTOLIC BLOOD PRESSURE: 72 MMHG | OXYGEN SATURATION: 98 % | HEIGHT: 58 IN | TEMPERATURE: 98 F | WEIGHT: 106 LBS | BODY MASS INDEX: 22.25 KG/M2

## 2019-03-11 DIAGNOSIS — K62.5 BRIGHT RED RECTAL BLEEDING: Primary | ICD-10-CM

## 2019-03-11 PROCEDURE — 99213 OFFICE O/P EST LOW 20 MIN: CPT | Performed by: NURSE PRACTITIONER

## 2019-03-11 NOTE — PATIENT INSTRUCTIONS
Hemorrhoids    Hemorrhoids are swollen and inflamed veins inside the rectum and near the anus. The rectum is the last several inches of the colon. The anus is the passage between the rectum and the outside of the body.   Causes  The veins can become swoll ? Take a fiber supplement or bulking agent, if advised by your healthcare provider. These include products such as psyllium or methylcellulose. · Drink more water. Your healthcare provider may direct you to drink plenty of water.  This can help keep stool

## 2019-03-11 NOTE — TELEPHONE ENCOUNTER
Pt had menses last week. But now is having bleeding and not sure if it's rectal or vaginal.  Was given appointment today at 2:00pm with Makenna Dai.

## 2019-04-26 ENCOUNTER — LAB ENCOUNTER (OUTPATIENT)
Dept: LAB | Facility: HOSPITAL | Age: 34
End: 2019-04-26
Attending: OBSTETRICS & GYNECOLOGY
Payer: COMMERCIAL

## 2019-04-26 DIAGNOSIS — N91.2 ABSENCE OF MENSTRUATION: Primary | ICD-10-CM

## 2019-04-26 PROCEDURE — 36415 COLL VENOUS BLD VENIPUNCTURE: CPT

## 2019-04-26 PROCEDURE — 84702 CHORIONIC GONADOTROPIN TEST: CPT

## 2019-04-26 PROCEDURE — 84144 ASSAY OF PROGESTERONE: CPT

## 2019-05-05 ENCOUNTER — TELEPHONE (OUTPATIENT)
Dept: INTERNAL MEDICINE CLINIC | Facility: CLINIC | Age: 34
End: 2019-05-05

## 2019-05-06 NOTE — TELEPHONE ENCOUNTER
Last OV: 3/11/19 with JODIE Skinner  Last refill date: 2/25/19     #/refills: #30, 1 refill   When pt was asked to return for OV: prn  Upcoming appt/reason: no upcoming appt  Last labs 2/22/19     Looks like PCP is Dr. Johanna Donohue

## 2019-05-09 ENCOUNTER — TELEPHONE (OUTPATIENT)
Dept: INTERNAL MEDICINE CLINIC | Facility: CLINIC | Age: 34
End: 2019-05-09

## 2019-05-09 RX ORDER — LABETALOL 100 MG/1
TABLET, FILM COATED ORAL
Qty: 30 TABLET | Refills: 0 | OUTPATIENT
Start: 2019-05-09

## 2019-05-09 NOTE — TELEPHONE ENCOUNTER
Please call and clarify who pt's PCP is (previously seeing [facility], now Dr. Chris Carpenter listed). If she is no longer a patient of [facility] please let Berto Keith know so she may removed from our lists.

## 2019-05-14 NOTE — TELEPHONE ENCOUNTER
Patient confirmed Dr. Fleta Lennox is pcp and is selected as pcp with insurance    Patient verifying with insurance again TO listed as pcp     Refill needed asap (see previous encounter)

## 2019-05-14 NOTE — TELEPHONE ENCOUNTER
Pt confirmed Dr. Audi Chowdhury is pcp - states pcp is listed correctly with insurance but will call and verify     Pt completely out of meds as of 5/15/19. Pt is in first trimester of pregnancy.   Is scheduled with fetal maternal cardiac specialist     Juan #

## 2019-05-15 RX ORDER — LABETALOL 100 MG/1
TABLET, FILM COATED ORAL
Qty: 30 TABLET | Refills: 0 | Status: SHIPPED | OUTPATIENT
Start: 2019-05-15 | End: 2019-05-16

## 2019-05-15 NOTE — TELEPHONE ENCOUNTER
Pt notified of information listed below.      Future Appointments   Date Time Provider Armando Miller   6/4/2019  9:00 AM MD PRISCILLA Mckeon [facility] [place]   6/7/2019  8:00 AM Marcus Vargas MD [facility] [place]

## 2019-05-16 ENCOUNTER — PATIENT OUTREACH (OUTPATIENT)
Dept: INTERNAL MEDICINE CLINIC | Facility: CLINIC | Age: 34
End: 2019-05-16

## 2019-05-16 RX ORDER — LABETALOL 100 MG/1
TABLET, FILM COATED ORAL
Qty: 60 TABLET | Refills: 0 | OUTPATIENT
Start: 2019-05-16

## 2019-05-16 NOTE — PROGRESS NOTES
I SPOKE TO PATIENT, WHO CONFIRMS DR. Ez Rowell IS NOT HER PCP. TOLD HER I WILL SUBMIT FORM TO REMOVER HER FROM HER LIST SO THAT WE DO NOT CALL HER AGAIN.

## 2019-05-16 NOTE — TELEPHONE ENCOUNTER
Confirm if patient is taking 1/2 tablet twice daily or 1 tablet twice daily     Hx shows 1/2 tablet twice daily

## 2019-05-30 ENCOUNTER — LAB ENCOUNTER (OUTPATIENT)
Dept: LAB | Age: 34
End: 2019-05-30
Attending: OBSTETRICS & GYNECOLOGY
Payer: COMMERCIAL

## 2019-05-30 DIAGNOSIS — Z34.80 SUPERVISION OF OTHER NORMAL PREGNANCY: Primary | ICD-10-CM

## 2019-05-30 PROCEDURE — 88175 CYTOPATH C/V AUTO FLUID REDO: CPT

## 2019-05-30 PROCEDURE — 87624 HPV HI-RISK TYP POOLED RSLT: CPT

## 2019-06-04 PROBLEM — O09.529 AMA (ADVANCED MATERNAL AGE) MULTIGRAVIDA 35+ (HCC): Status: ACTIVE | Noted: 2019-06-04

## 2019-06-04 PROBLEM — O09.529 AMA (ADVANCED MATERNAL AGE) MULTIGRAVIDA 35+: Status: ACTIVE | Noted: 2019-06-04

## 2019-06-04 PROCEDURE — 84156 ASSAY OF PROTEIN URINE: CPT | Performed by: OBSTETRICS & GYNECOLOGY

## 2019-06-04 PROCEDURE — 81508 FTL CGEN ABNOR TWO PROTEINS: CPT | Performed by: OBSTETRICS & GYNECOLOGY

## 2019-06-04 PROCEDURE — 82570 ASSAY OF URINE CREATININE: CPT | Performed by: OBSTETRICS & GYNECOLOGY

## 2019-07-11 ENCOUNTER — APPOINTMENT (OUTPATIENT)
Dept: LAB | Age: 34
End: 2019-07-11
Attending: FAMILY MEDICINE
Payer: COMMERCIAL

## 2019-07-11 ENCOUNTER — OFFICE VISIT (OUTPATIENT)
Dept: INTERNAL MEDICINE CLINIC | Facility: CLINIC | Age: 34
End: 2019-07-11

## 2019-07-11 VITALS
OXYGEN SATURATION: 100 % | TEMPERATURE: 98 F | RESPIRATION RATE: 14 BRPM | SYSTOLIC BLOOD PRESSURE: 90 MMHG | HEART RATE: 70 BPM | WEIGHT: 108 LBS | BODY MASS INDEX: 21.77 KG/M2 | DIASTOLIC BLOOD PRESSURE: 60 MMHG | HEIGHT: 59 IN

## 2019-07-11 DIAGNOSIS — Z00.00 WELL ADULT EXAM: Primary | ICD-10-CM

## 2019-07-11 DIAGNOSIS — I10 BENIGN ESSENTIAL HYPERTENSION: ICD-10-CM

## 2019-07-11 LAB
CHOLEST SMN-MCNC: 252 MG/DL (ref ?–200)
HDLC SERPL-MCNC: 119 MG/DL (ref 40–59)
LDLC SERPL CALC-MCNC: 116 MG/DL (ref ?–100)
NONHDLC SERPL-MCNC: 133 MG/DL (ref ?–130)
TRIGL SERPL-MCNC: 87 MG/DL (ref 30–149)
VLDLC SERPL CALC-MCNC: 17 MG/DL (ref 0–30)

## 2019-07-11 PROCEDURE — 80061 LIPID PANEL: CPT | Performed by: FAMILY MEDICINE

## 2019-07-11 PROCEDURE — 36415 COLL VENOUS BLD VENIPUNCTURE: CPT | Performed by: FAMILY MEDICINE

## 2019-07-11 PROCEDURE — 99395 PREV VISIT EST AGE 18-39: CPT | Performed by: FAMILY MEDICINE

## 2019-07-11 RX ORDER — IRON,FM,PS/FOLIC/B,C18/L.CASEI 130-1.25MG
1 CAPSULE ORAL DAILY
Status: ON HOLD | COMMUNITY
End: 2021-04-05

## 2019-07-11 RX ORDER — LABETALOL 100 MG/1
50 TABLET, FILM COATED ORAL DAILY
Qty: 30 TABLET | Refills: 3 | COMMUNITY
Start: 2019-07-11 | End: 2020-06-15

## 2019-07-11 RX ORDER — CHOLECALCIFEROL (VITAMIN D3) 125 MCG
1 CAPSULE ORAL DAILY
Status: ON HOLD | COMMUNITY
End: 2019-12-12

## 2019-07-11 RX ORDER — ASPIRIN 81 MG/1
TABLET ORAL
Status: ON HOLD | COMMUNITY
Start: 2019-06-04 | End: 2019-12-12

## 2019-07-11 RX ORDER — GARLIC EXTRACT 500 MG
1 CAPSULE ORAL DAILY
COMMUNITY

## 2019-07-11 NOTE — PROGRESS NOTES
HPI:    Patient ID: Cheikh Ponce is a 28year old female.     HPI  HPI:   Cheikh Ponce is a 28year old female who presents for a complete physical exam.     Wt Readings from Last 6 Encounters:  07/11/19 : 108 lb  06/04/19 : 104 lb  03/1 smoker    • History of chicken pox    • Ovarian cyst    • Ovarian cyst    • Pertussis 11/7/2016      No past surgical history on file.    Family History   Problem Relation Age of Onset   • Other Father         age 46   • Hypertension Father    • Lipids Moth clear  NECK: supple,no adenopathy  LUNGS: clear to auscultation  CARDIO: RRR without murmur  GI: enlarged uterus from the pregnancy    EXTREMITIES: no edema  NEURO: motor and sensory are grossly intact    ASSESSMENT AND PLAN:   Gumaro Christopher is a

## 2019-10-07 ENCOUNTER — TELEPHONE (OUTPATIENT)
Dept: INTERNAL MEDICINE CLINIC | Facility: CLINIC | Age: 34
End: 2019-10-07

## 2019-10-07 NOTE — TELEPHONE ENCOUNTER
Pt reports hx of PVCs however the last 3 days she has noticed them daily. Pt is 30 weeks pregnant and does f/u with maternal fetal medicine who monitors bp.  Pt had 30 week appointment today and they recommended pt to f/u with pcp to discuss holter monitor

## 2019-10-08 ENCOUNTER — OFFICE VISIT (OUTPATIENT)
Dept: INTERNAL MEDICINE CLINIC | Facility: CLINIC | Age: 34
End: 2019-10-08

## 2019-10-08 VITALS
BODY MASS INDEX: 24.8 KG/M2 | DIASTOLIC BLOOD PRESSURE: 76 MMHG | HEIGHT: 59 IN | WEIGHT: 123 LBS | OXYGEN SATURATION: 98 % | TEMPERATURE: 99 F | RESPIRATION RATE: 14 BRPM | SYSTOLIC BLOOD PRESSURE: 118 MMHG | HEART RATE: 80 BPM

## 2019-10-08 DIAGNOSIS — R00.2 HEART PALPITATIONS: Primary | ICD-10-CM

## 2019-10-08 PROCEDURE — 99213 OFFICE O/P EST LOW 20 MIN: CPT | Performed by: FAMILY MEDICINE

## 2019-10-08 PROCEDURE — 90686 IIV4 VACC NO PRSV 0.5 ML IM: CPT | Performed by: FAMILY MEDICINE

## 2019-10-08 PROCEDURE — 90471 IMMUNIZATION ADMIN: CPT | Performed by: FAMILY MEDICINE

## 2019-10-08 NOTE — PROGRESS NOTES
CHIEF COMPLAINT:     Patient presents with:  Palpitations: has been going on for 3 days, throughout the whole day it happens. Pt states she does have SOB, more last night then another night, Pt denies any chest tightness.  Pt did state that she feels her he cyst    • Ovarian cyst    • Pertussis 11/7/2016      Social History:  Social History    Tobacco Use      Smoking status: Former Smoker        Packs/day: 0.10        Years: 3.00        Pack years: .3        Types: Cigarettes      Smokeless tobacco: Former U Physical Exam    ASSESSMENT AND PLAN:     Heart palpitations  (primary encounter diagnosis)    Orders Placed This Encounter      Flulaval 6 months and older 0.5 ml Quad PF [31643]    1. Heart palpitations  Pt to schedule for holter monitor.  Pt will be

## 2019-10-09 ENCOUNTER — HOSPITAL ENCOUNTER (OUTPATIENT)
Dept: CV DIAGNOSTICS | Facility: HOSPITAL | Age: 34
Discharge: HOME | End: 2019-10-09
Attending: FAMILY MEDICINE
Payer: COMMERCIAL

## 2019-10-09 DIAGNOSIS — R00.2 HEART PALPITATIONS: ICD-10-CM

## 2019-10-09 PROCEDURE — 93227 XTRNL ECG REC<48 HR R&I: CPT | Performed by: FAMILY MEDICINE

## 2019-10-09 PROCEDURE — 93226 XTRNL ECG REC<48 HR SCAN A/R: CPT | Performed by: FAMILY MEDICINE

## 2019-10-09 PROCEDURE — 93225 XTRNL ECG REC<48 HRS REC: CPT | Performed by: FAMILY MEDICINE

## 2019-12-10 ENCOUNTER — ANESTHESIA (OUTPATIENT)
Dept: OBGYN UNIT | Facility: HOSPITAL | Age: 34
End: 2019-12-10
Payer: COMMERCIAL

## 2019-12-10 ENCOUNTER — TELEPHONE (OUTPATIENT)
Dept: OBGYN UNIT | Facility: HOSPITAL | Age: 34
End: 2019-12-10

## 2019-12-10 ENCOUNTER — HOSPITAL ENCOUNTER (INPATIENT)
Facility: HOSPITAL | Age: 34
LOS: 3 days | Discharge: HOME | End: 2019-12-13
Attending: OBSTETRICS & GYNECOLOGY | Admitting: OBSTETRICS & GYNECOLOGY
Payer: COMMERCIAL

## 2019-12-10 ENCOUNTER — APPOINTMENT (OUTPATIENT)
Dept: OBGYN CLINIC | Facility: HOSPITAL | Age: 34
End: 2019-12-10
Payer: COMMERCIAL

## 2019-12-10 ENCOUNTER — ANESTHESIA EVENT (OUTPATIENT)
Dept: OBGYN UNIT | Facility: HOSPITAL | Age: 34
End: 2019-12-10
Payer: COMMERCIAL

## 2019-12-10 PROBLEM — Z34.90 PREGNANCY (HCC): Status: ACTIVE | Noted: 2019-12-10

## 2019-12-10 PROBLEM — Z34.90 PREGNANCY: Status: ACTIVE | Noted: 2019-12-10

## 2019-12-10 PROCEDURE — 86780 TREPONEMA PALLIDUM: CPT | Performed by: OBSTETRICS & GYNECOLOGY

## 2019-12-10 PROCEDURE — 86850 RBC ANTIBODY SCREEN: CPT | Performed by: OBSTETRICS & GYNECOLOGY

## 2019-12-10 PROCEDURE — 81002 URINALYSIS NONAUTO W/O SCOPE: CPT

## 2019-12-10 PROCEDURE — 86901 BLOOD TYPING SEROLOGIC RH(D): CPT | Performed by: OBSTETRICS & GYNECOLOGY

## 2019-12-10 PROCEDURE — 86900 BLOOD TYPING SEROLOGIC ABO: CPT | Performed by: OBSTETRICS & GYNECOLOGY

## 2019-12-10 PROCEDURE — 80053 COMPREHEN METABOLIC PANEL: CPT | Performed by: OBSTETRICS & GYNECOLOGY

## 2019-12-10 PROCEDURE — 84550 ASSAY OF BLOOD/URIC ACID: CPT | Performed by: OBSTETRICS & GYNECOLOGY

## 2019-12-10 PROCEDURE — 85027 COMPLETE CBC AUTOMATED: CPT | Performed by: OBSTETRICS & GYNECOLOGY

## 2019-12-10 RX ORDER — TRISODIUM CITRATE DIHYDRATE AND CITRIC ACID MONOHYDRATE 500; 334 MG/5ML; MG/5ML
30 SOLUTION ORAL AS NEEDED
Status: DISCONTINUED | OUTPATIENT
Start: 2019-12-10 | End: 2019-12-11 | Stop reason: HOSPADM

## 2019-12-10 RX ORDER — IBUPROFEN 600 MG/1
600 TABLET ORAL ONCE AS NEEDED
Status: DISCONTINUED | OUTPATIENT
Start: 2019-12-10 | End: 2019-12-11 | Stop reason: HOSPADM

## 2019-12-10 RX ORDER — LIDOCAINE HYDROCHLORIDE AND EPINEPHRINE 15; 5 MG/ML; UG/ML
INJECTION, SOLUTION EPIDURAL AS NEEDED
Status: DISCONTINUED | OUTPATIENT
Start: 2019-12-10 | End: 2019-12-11 | Stop reason: SURG

## 2019-12-10 RX ORDER — NALBUPHINE HCL 10 MG/ML
2.5 AMPUL (ML) INJECTION
Status: DISCONTINUED | OUTPATIENT
Start: 2019-12-10 | End: 2019-12-13

## 2019-12-10 RX ORDER — SODIUM CHLORIDE, SODIUM LACTATE, POTASSIUM CHLORIDE, CALCIUM CHLORIDE 600; 310; 30; 20 MG/100ML; MG/100ML; MG/100ML; MG/100ML
INJECTION, SOLUTION INTRAVENOUS CONTINUOUS
Status: DISCONTINUED | OUTPATIENT
Start: 2019-12-10 | End: 2019-12-11 | Stop reason: HOSPADM

## 2019-12-10 RX ORDER — TERBUTALINE SULFATE 1 MG/ML
0.25 INJECTION, SOLUTION SUBCUTANEOUS AS NEEDED
Status: DISCONTINUED | OUTPATIENT
Start: 2019-12-10 | End: 2019-12-11 | Stop reason: HOSPADM

## 2019-12-10 RX ORDER — EPHEDRINE SULFATE/0.9% NACL/PF 25 MG/5 ML
5 SYRINGE (ML) INTRAVENOUS AS NEEDED
Status: DISCONTINUED | OUTPATIENT
Start: 2019-12-10 | End: 2019-12-13

## 2019-12-10 RX ORDER — LABETALOL 100 MG/1
50 TABLET, FILM COATED ORAL 2 TIMES DAILY
Status: DISCONTINUED | OUTPATIENT
Start: 2019-12-10 | End: 2019-12-10

## 2019-12-10 RX ORDER — AMMONIA INHALANTS 0.04 G/.3ML
0.3 INHALANT RESPIRATORY (INHALATION) AS NEEDED
Status: DISCONTINUED | OUTPATIENT
Start: 2019-12-10 | End: 2019-12-11 | Stop reason: HOSPADM

## 2019-12-10 RX ORDER — LABETALOL 100 MG/1
50 TABLET, FILM COATED ORAL 2 TIMES DAILY
Status: DISCONTINUED | OUTPATIENT
Start: 2019-12-10 | End: 2019-12-11

## 2019-12-10 RX ORDER — DEXTROSE, SODIUM CHLORIDE, SODIUM LACTATE, POTASSIUM CHLORIDE, AND CALCIUM CHLORIDE 5; .6; .31; .03; .02 G/100ML; G/100ML; G/100ML; G/100ML; G/100ML
INJECTION, SOLUTION INTRAVENOUS AS NEEDED
Status: DISCONTINUED | OUTPATIENT
Start: 2019-12-10 | End: 2019-12-11 | Stop reason: HOSPADM

## 2019-12-10 RX ADMIN — LIDOCAINE HYDROCHLORIDE AND EPINEPHRINE 4 ML: 15; 5 INJECTION, SOLUTION EPIDURAL at 21:42:00

## 2019-12-10 NOTE — PROGRESS NOTES
with edc 19 (39 week 3 days) presents to room 114 for induction of labor. Hx chronic HTN, denies srom or vag bleeding or u/c's. EFM tested/applied/explained. poc discussed with pt & spouse, both verb understanding.

## 2019-12-10 NOTE — PROGRESS NOTES
In to see pt, pt in bed (L) lateral. POC discussed w/pt & spouse. Pt states she was on birthing ball, pt would like to rest at this time. Pt states feeling discomfort w/uc's, pt denies needs for pain meds at this time. Call light w/in reach.

## 2019-12-11 PROCEDURE — 0UQMXZZ REPAIR VULVA, EXTERNAL APPROACH: ICD-10-PCS | Performed by: OBSTETRICS & GYNECOLOGY

## 2019-12-11 PROCEDURE — 88307 TISSUE EXAM BY PATHOLOGIST: CPT | Performed by: OBSTETRICS & GYNECOLOGY

## 2019-12-11 PROCEDURE — 0KQM0ZZ REPAIR PERINEUM MUSCLE, OPEN APPROACH: ICD-10-PCS | Performed by: OBSTETRICS & GYNECOLOGY

## 2019-12-11 PROCEDURE — 3E033VJ INTRODUCTION OF OTHER HORMONE INTO PERIPHERAL VEIN, PERCUTANEOUS APPROACH: ICD-10-PCS | Performed by: OBSTETRICS & GYNECOLOGY

## 2019-12-11 RX ORDER — ACETAMINOPHEN 325 MG/1
650 TABLET ORAL EVERY 6 HOURS PRN
Status: DISCONTINUED | OUTPATIENT
Start: 2019-12-11 | End: 2019-12-13

## 2019-12-11 RX ORDER — LABETALOL 100 MG/1
50 TABLET, FILM COATED ORAL DAILY
Status: DISCONTINUED | OUTPATIENT
Start: 2019-12-11 | End: 2019-12-12

## 2019-12-11 RX ORDER — IBUPROFEN 600 MG/1
600 TABLET ORAL EVERY 6 HOURS
Status: DISCONTINUED | OUTPATIENT
Start: 2019-12-11 | End: 2019-12-13

## 2019-12-11 RX ORDER — ZOLPIDEM TARTRATE 5 MG/1
5 TABLET ORAL NIGHTLY PRN
Status: DISCONTINUED | OUTPATIENT
Start: 2019-12-11 | End: 2019-12-13

## 2019-12-11 RX ORDER — DOCUSATE SODIUM 100 MG/1
100 CAPSULE, LIQUID FILLED ORAL
Status: DISCONTINUED | OUTPATIENT
Start: 2019-12-11 | End: 2019-12-13

## 2019-12-11 RX ORDER — SIMETHICONE 80 MG
80 TABLET,CHEWABLE ORAL 3 TIMES DAILY PRN
Status: DISCONTINUED | OUTPATIENT
Start: 2019-12-11 | End: 2019-12-13

## 2019-12-11 NOTE — PROGRESS NOTES
Pt received in room 2198 via wheelchair, carrying infant. Infant placed in open crib. IV infusing. Hugs and kisses already in place. ID bands verified with Jocelyne Encarnacion. Pt and  oriented to room and plan of care.   Pt is due to void (lubin out at 0

## 2019-12-11 NOTE — L&D DELIVERY NOTE
[facility]    [facility]   ATTENDING PHYSICIAN: Zohra Aranda M.D.    PATIENT ACCOUNT #: [de-identified] LOCATION:  [address] [place] [address]   MEDICAL RECORD #: [id number] YOB: 1985   ADMISSION DATE: 12/10/2019 DELIVERY D of chronic hypertension and meconium-stained amniotic fluid was noted during the patient's labor. The patient's uterus was massaged until firm. Pitocin was placed in IV fluid. Cervix and rectum were noted to be grossly within normal limits.   A second-de

## 2019-12-11 NOTE — ANESTHESIA PROCEDURE NOTES
Labor Analgesia  Performed by: Nash Perez MD  Authorized by: Nash Perez MD       General Information and Staff    Start Time:  12/10/2019 7:46 PM  Anesthesiologist:  Nash Perez MD  Performed by:   Anesthesiologist  Patient Location:

## 2019-12-11 NOTE — H&P
[facility]    PATIENT'S NAME: Katlyn Marquez   ATTENDING PHYSICIAN: Jarrod Crowe M.D.    PATIENT ACCOUNT#:   [de-identified]    LOCATION:  [address] [place] [address]  MEDICAL RECORD #:   [id number]       YOB: 1985  ADMISSION DATE:       12/ spontaneous AB.    OTHER PAST HISTORY:  In 2015, the patient was diagnosed with hypertension. SOCIAL HISTORY:  The patient denies smoking cigarettes. The patient denies drinking alcoholic beverages during the pregnancy.   The patient claims that when sh augmented with IV Pitocin as per protocol. The patient is requesting epidural for pain relief during her labor. Will also anticipate a vaginal delivery.     Dictated By Napoleon Terry M.D.  d: 12/11/2019 06:30:31  t: 12/11/2019 07:51:56  [facility] [id number]/

## 2019-12-11 NOTE — ANESTHESIA PREPROCEDURE EVALUATION
PRE-OP EVALUATION    Patient Name: Lawyer Go    Pre-op Diagnosis: * Term preg, LABOR *    * Labor Epidural *    * No surgeons found in log *    Pre-op vitals reviewed.   Temp: 97.4 °F (36.3 °C)  Pulse: 75  Resp: 18  BP: 119/64  SpO2: 100 %  There is daily., Disp: , Rfl:   Acidophilus/Pectin Oral Cap, Take 1 capsule by mouth daily. , Disp: , Rfl:   Prenatal Vit-Fe Fumarate-FA (PRENATAL/FOLIC ACID) Oral Tab, Take by mouth., Disp: , Rfl:         Allergies: Mollusks; Scallops;  Ace Inhibitors; Calamari Oil dental history. Pulmonary    Pulmonary exam normal.                 Other findings            ASA: 2   Plan: epidural  NPO status verified and patient meets guidelines. Patient has not taken beta blockers in last 24 hours.   Post-procedure pain man

## 2019-12-11 NOTE — PROGRESS NOTES
Patient up to bathroom with assist x 2. /Unable to void at this time. Patient transferred to mother/baby room 2197 per wheelchair in stable condition with baby and personal belongings. Accompanied by significant other and staff.   Report given to mother/ba

## 2019-12-12 PROCEDURE — 85025 COMPLETE CBC W/AUTO DIFF WBC: CPT | Performed by: OBSTETRICS & GYNECOLOGY

## 2019-12-12 RX ORDER — LABETALOL 100 MG/1
50 TABLET, FILM COATED ORAL EVERY 12 HOURS SCHEDULED
Status: DISCONTINUED | OUTPATIENT
Start: 2019-12-12 | End: 2019-12-13

## 2019-12-12 NOTE — PROGRESS NOTES
Labor Analgesia Follow Up Note    Patient underwent epidural anesthesia for labor analgesia,    Placenta Date/Time: 12/11/2019  4:21 AM    Delivery Date/Time[de-identified] 12/11/2019  4:14 AM    /84   Pulse 77   Temp 98 °F (36.7 °C) (Oral)   Resp 18   Ht 1.473 m

## 2019-12-12 NOTE — PLAN OF CARE
Problem: GENITOURINARY - ADULT  Goal: Absence of urinary retention  Description  INTERVENTIONS:  - Assess patient’s ability to void and empty bladder  - Monitor intake/output and perform bladder scan as needed  - Follow urinary retention protocol/standar breast feeding aids (e.g., infant sling, nursing footstool/pillows, and breast pumps). - Encourage mother/other family members to express feelings/concerns, and actively listen.   - Educate father/SO about benefits of breast feeding and how to manage commo

## 2019-12-13 VITALS
RESPIRATION RATE: 18 BRPM | SYSTOLIC BLOOD PRESSURE: 116 MMHG | HEIGHT: 58 IN | TEMPERATURE: 99 F | HEART RATE: 75 BPM | DIASTOLIC BLOOD PRESSURE: 89 MMHG | WEIGHT: 128 LBS | BODY MASS INDEX: 26.87 KG/M2 | OXYGEN SATURATION: 100 %

## 2019-12-13 NOTE — PROGRESS NOTES
PPD Number 1  S - Patient is doing fine.  Patient desires to go home on 12-13-19.   O - Afebrile VSS; B Positive  Lochia Minimal   A - Stable  P - 1. Patient informed that she is anemic.   Patient desires to hold off on getting iron thru the I[employer] route a

## 2019-12-13 NOTE — PROGRESS NOTES
Pt to follow up hgb in 1 week  Discharge to home  Accompanied by staff to car  All belongings taken by patient  No questions regarding discharge instructions/teaching

## 2019-12-15 ENCOUNTER — TELEPHONE (OUTPATIENT)
Dept: OBGYN UNIT | Facility: HOSPITAL | Age: 34
End: 2019-12-15

## 2019-12-17 ENCOUNTER — TELEPHONE (OUTPATIENT)
Dept: OBGYN UNIT | Facility: HOSPITAL | Age: 34
End: 2019-12-17

## 2019-12-17 ENCOUNTER — NURSE ONLY (OUTPATIENT)
Dept: LACTATION | Facility: HOSPITAL | Age: 34
End: 2019-12-17
Attending: OBSTETRICS & GYNECOLOGY
Payer: COMMERCIAL

## 2019-12-17 PROCEDURE — 99213 OFFICE O/P EST LOW 20 MIN: CPT

## 2019-12-17 NOTE — PROGRESS NOTES
LACTATION NOTE - MOTHER      Evaluation Type: Outpatient Initial    Problems identified  Problems identified: Knowledge deficit;Milk supply not WNL  Milk supply not WNL: Reduced (potential)  Problems Identified Other: infant breastfeeding with supplementat this visit, FOB present during visit actively involved.

## 2019-12-18 ENCOUNTER — APPOINTMENT (OUTPATIENT)
Dept: LACTATION | Facility: HOSPITAL | Age: 34
End: 2019-12-18
Attending: OBSTETRICS & GYNECOLOGY
Payer: COMMERCIAL

## 2019-12-30 ENCOUNTER — NURSE ONLY (OUTPATIENT)
Dept: LACTATION | Facility: HOSPITAL | Age: 34
End: 2019-12-30
Attending: OBSTETRICS & GYNECOLOGY
Payer: COMMERCIAL

## 2019-12-30 DIAGNOSIS — O92.4 HYPOGALACTIA, POSTPARTUM: ICD-10-CM

## 2019-12-30 PROCEDURE — 99213 OFFICE O/P EST LOW 20 MIN: CPT

## 2019-12-31 NOTE — PROGRESS NOTES
LACTATION NOTE - MOTHER      Evaluation Type: Outpatient Follow Up(Infant gaining weight with breastfeeding/supplementing. Infant gained 19 oz in 13 days.  infant with suspected oral restriction)    Problems identified  Problems identified: Knowledge defici

## 2020-01-22 ENCOUNTER — NURSE ONLY (OUTPATIENT)
Dept: LACTATION | Facility: HOSPITAL | Age: 35
End: 2020-01-22
Attending: OBSTETRICS & GYNECOLOGY
Payer: COMMERCIAL

## 2020-01-22 PROCEDURE — 99214 OFFICE O/P EST MOD 30 MIN: CPT

## 2020-01-23 NOTE — PROGRESS NOTES
LACTATION NOTE - MOTHER      Evaluation Type: Outpatient Initial(Infant gaining weight with breastfeeding, bottle feeding EBM, and 5-2oz formula bottles/week)    Problems identified  Problems identified: Knowledge deficit;Milk supply not WNL  Milk supply n plan recommendations reviewed with encouraged increase pump sessions at work, supplementation with some feedings due to long feedings at breast and insufficient milk transfer witnessed today.

## 2020-02-28 ENCOUNTER — OFFICE VISIT (OUTPATIENT)
Dept: INTERNAL MEDICINE CLINIC | Facility: CLINIC | Age: 35
End: 2020-02-28

## 2020-02-28 VITALS
DIASTOLIC BLOOD PRESSURE: 76 MMHG | HEART RATE: 68 BPM | SYSTOLIC BLOOD PRESSURE: 126 MMHG | OXYGEN SATURATION: 98 % | RESPIRATION RATE: 16 BRPM | HEIGHT: 58 IN | WEIGHT: 107.5 LBS | BODY MASS INDEX: 22.56 KG/M2 | TEMPERATURE: 98 F

## 2020-02-28 DIAGNOSIS — I10 BENIGN ESSENTIAL HYPERTENSION: Primary | ICD-10-CM

## 2020-02-28 DIAGNOSIS — D64.9 ANEMIA, UNSPECIFIED TYPE: ICD-10-CM

## 2020-02-28 PROCEDURE — 99213 OFFICE O/P EST LOW 20 MIN: CPT | Performed by: NURSE PRACTITIONER

## 2020-02-28 RX ORDER — LABETALOL 100 MG/1
50 TABLET, FILM COATED ORAL 2 TIMES DAILY
Qty: 60 TABLET | Refills: 0 | Status: SHIPPED | OUTPATIENT
Start: 2020-02-28 | End: 2020-06-02

## 2020-02-28 NOTE — PATIENT INSTRUCTIONS
Understanding Preeclampsia  Preeclampsia is high blood pressure (hypertension) that happens during pregnancy. It often shows up around the 20th week of pregnancy. It often goes back to normal by the 12th week after you give birth.  It can lead to serious Call your healthcare provider if swelling, weight gain, or other symptoms come on quickly or are severe. Some cases of preeclampsia are more severe than others. Your symptoms also may change or get worse as you get closer to your due date. Who’s at risk? Postpartum preeclampsia that develops within the first 48 hours after delivery is rare. Another type of postpartum preeclampsia that develops more than 48 hours after delivery is called late-onset preeclampsia. It is also rare.  Contact your healthcare prov

## 2020-02-28 NOTE — PROGRESS NOTES
CHIEF COMPLAINT:     Patient presents with: Follow - Up: bp elevated      HPI:   Adan Sterling is a 27year old female patient presents for recheck of her hypertension.  Pt has been taking medications 100 mg in am as she forgets to take evening dose, n chills, weight change, decreased appetite  EYES: Denies blurred vision or double vision  CHEST: Denies chest pain, or palpitations  LUNGS: Denies shortness of breath, cough, or wheezing  NEURO: Some HA's as above      EXAM:   /76   Pulse 68   Temp 97

## 2020-03-27 ENCOUNTER — TELEPHONE (OUTPATIENT)
Dept: INTERNAL MEDICINE CLINIC | Facility: CLINIC | Age: 35
End: 2020-03-27

## 2020-03-27 RX ORDER — LABETALOL 200 MG/1
TABLET, FILM COATED ORAL
Qty: 180 TABLET | Refills: 0 | OUTPATIENT
Start: 2020-03-27

## 2020-03-27 RX ORDER — LABETALOL 200 MG/1
200 TABLET, FILM COATED ORAL 2 TIMES DAILY
Qty: 60 TABLET | Refills: 0 | Status: SHIPPED | OUTPATIENT
Start: 2020-03-27 | End: 2020-06-15

## 2020-03-27 NOTE — TELEPHONE ENCOUNTER
Patient calling in concerned about her blood pressure.   Pt has been taking Labetalol HCl 100 MG Oral Tab ( twice daily) ( 50 MG morning, 50 MG evening)    BP readings: 03/03/2020: 146/99  03/04/2020: 157/121  03/05/2020: 127/100  03/23/2020:179/123  03/23/

## 2020-03-27 NOTE — TELEPHONE ENCOUNTER
12:03pm Spoke with pt regarding elevated BP readings. BP today 127/95. BP has been up since having her baby on 12/11/19. Pt has been having headaches towards the evening and when Pt takes her BP it has been up.  Pt has been taking the Labetalol 100 mg one t

## 2020-06-01 DIAGNOSIS — I10 BENIGN ESSENTIAL HYPERTENSION: ICD-10-CM

## 2020-06-02 DIAGNOSIS — I10 BENIGN ESSENTIAL HYPERTENSION: ICD-10-CM

## 2020-06-02 RX ORDER — LABETALOL 100 MG/1
TABLET, FILM COATED ORAL
Qty: 90 TABLET | Refills: 0 | OUTPATIENT
Start: 2020-06-02

## 2020-06-02 RX ORDER — LABETALOL 100 MG/1
TABLET, FILM COATED ORAL
Qty: 60 TABLET | Refills: 0 | Status: ON HOLD | OUTPATIENT
Start: 2020-06-02 | End: 2021-04-04

## 2020-06-15 ENCOUNTER — OFFICE VISIT (OUTPATIENT)
Dept: INTERNAL MEDICINE CLINIC | Facility: CLINIC | Age: 35
End: 2020-06-15

## 2020-06-15 VITALS
BODY MASS INDEX: 22.41 KG/M2 | HEART RATE: 70 BPM | SYSTOLIC BLOOD PRESSURE: 128 MMHG | WEIGHT: 106.75 LBS | OXYGEN SATURATION: 98 % | RESPIRATION RATE: 16 BRPM | DIASTOLIC BLOOD PRESSURE: 84 MMHG | TEMPERATURE: 99 F | HEIGHT: 58 IN

## 2020-06-15 DIAGNOSIS — I10 BENIGN ESSENTIAL HYPERTENSION: Primary | ICD-10-CM

## 2020-06-15 DIAGNOSIS — Z11.1 TUBERCULOSIS SCREENING: ICD-10-CM

## 2020-06-15 DIAGNOSIS — Z01.84 ANTIBODY RESPONSE EXAMINATION: ICD-10-CM

## 2020-06-15 PROCEDURE — 86580 TB INTRADERMAL TEST: CPT | Performed by: FAMILY MEDICINE

## 2020-06-15 PROCEDURE — 99213 OFFICE O/P EST LOW 20 MIN: CPT | Performed by: FAMILY MEDICINE

## 2020-06-15 RX ORDER — LABETALOL 100 MG/1
50 TABLET, FILM COATED ORAL 2 TIMES DAILY
Qty: 60 TABLET | Refills: 0 | Status: CANCELLED | OUTPATIENT
Start: 2020-06-15

## 2020-06-15 RX ORDER — LABETALOL 100 MG/1
100 TABLET, FILM COATED ORAL 2 TIMES DAILY
Qty: 60 TABLET | Refills: 2 | Status: ON HOLD | OUTPATIENT
Start: 2020-06-15 | End: 2021-04-04

## 2020-06-15 NOTE — PROGRESS NOTES
CHIEF COMPLAINT:     Patient presents with:  Blood Pressure  Imm/Inj: tb test       HPI:   Rebecca Marquez is a 27year old female   Patient presents for recheck of their hypertension.  Pt has been taking medication Labetalol 100 mg 1-2 daily depending on Former smoker    • History of chicken pox    • Ovarian cyst    • Ovarian cyst    • Pertussis 11/7/2016      Social History:  Social History    Tobacco Use      Smoking status: Former Smoker        Packs/day: 0.10        Years: 3.00        Pack years: .3 TB test, PPD/Tubersol/Aplisol (73512) (DX V74.1/Z11.1)      SARS-CoV-2 IgG Antibody (Covid) [E]      Meds & Refills for this Visit:  Requested Prescriptions     Signed Prescriptions Disp Refills   • Labetalol HCl 100 MG Oral Tab 60 tablet 2     Sig: Take 1

## 2020-06-17 ENCOUNTER — APPOINTMENT (OUTPATIENT)
Dept: INTERNAL MEDICINE CLINIC | Facility: CLINIC | Age: 35
End: 2020-06-17

## 2020-06-17 ENCOUNTER — APPOINTMENT (OUTPATIENT)
Dept: LAB | Age: 35
End: 2020-06-17
Attending: FAMILY MEDICINE
Payer: COMMERCIAL

## 2020-06-17 DIAGNOSIS — Z01.84 ANTIBODY RESPONSE EXAMINATION: ICD-10-CM

## 2020-06-17 PROCEDURE — 85025 COMPLETE CBC W/AUTO DIFF WBC: CPT | Performed by: NURSE PRACTITIONER

## 2020-06-17 PROCEDURE — 36415 COLL VENOUS BLD VENIPUNCTURE: CPT | Performed by: NURSE PRACTITIONER

## 2020-06-17 PROCEDURE — 86769 SARS-COV-2 COVID-19 ANTIBODY: CPT

## 2020-06-22 ENCOUNTER — LAB REQUISITION (OUTPATIENT)
Dept: LAB | Facility: HOSPITAL | Age: 35
End: 2020-06-22
Payer: COMMERCIAL

## 2020-06-22 DIAGNOSIS — Z11.51 ENCOUNTER FOR SCREENING FOR HUMAN PAPILLOMAVIRUS (HPV): ICD-10-CM

## 2020-06-22 DIAGNOSIS — Z01.419 ENCOUNTER FOR GYNECOLOGICAL EXAMINATION (GENERAL) (ROUTINE) WITHOUT ABNORMAL FINDINGS: ICD-10-CM

## 2020-06-22 LAB
HIV RESULT OB: NEGATIVE
HIV RESULT OB: NEGATIVE

## 2020-06-22 PROCEDURE — 88175 CYTOPATH C/V AUTO FLUID REDO: CPT | Performed by: OBSTETRICS & GYNECOLOGY

## 2020-06-22 PROCEDURE — 87624 HPV HI-RISK TYP POOLED RSLT: CPT | Performed by: OBSTETRICS & GYNECOLOGY

## 2020-12-22 ENCOUNTER — ORDER TRANSCRIPTION (OUTPATIENT)
Dept: ADMINISTRATIVE | Facility: HOSPITAL | Age: 35
End: 2020-12-22

## 2020-12-22 DIAGNOSIS — Z34.80 SUPERVISION OF OTHER NORMAL PREGNANCY: Primary | ICD-10-CM

## 2020-12-22 DIAGNOSIS — Z03.818 ENCOUNTER FOR OBSERVATION FOR SUSPECTED EXPOSURE TO OTHER BIOLOGICAL AGENTS RULED OUT: ICD-10-CM

## 2020-12-23 ENCOUNTER — LAB ENCOUNTER (OUTPATIENT)
Dept: LAB | Age: 35
End: 2020-12-23
Attending: OBSTETRICS & GYNECOLOGY
Payer: COMMERCIAL

## 2020-12-23 DIAGNOSIS — Z03.818 ENCOUNTER FOR OBSERVATION FOR SUSPECTED EXPOSURE TO OTHER BIOLOGICAL AGENTS RULED OUT: ICD-10-CM

## 2020-12-23 DIAGNOSIS — Z34.80 SUPERVISION OF OTHER NORMAL PREGNANCY: ICD-10-CM

## 2021-03-04 ENCOUNTER — APPOINTMENT (OUTPATIENT)
Dept: ULTRASOUND IMAGING | Facility: HOSPITAL | Age: 36
End: 2021-03-04
Attending: OBSTETRICS & GYNECOLOGY
Payer: COMMERCIAL

## 2021-03-04 ENCOUNTER — HOSPITAL ENCOUNTER (OUTPATIENT)
Facility: HOSPITAL | Age: 36
Setting detail: OBSERVATION
Discharge: HOME | End: 2021-03-04
Attending: OBSTETRICS & GYNECOLOGY | Admitting: OBSTETRICS & GYNECOLOGY
Payer: COMMERCIAL

## 2021-03-04 VITALS
BODY MASS INDEX: 26.24 KG/M2 | DIASTOLIC BLOOD PRESSURE: 83 MMHG | HEIGHT: 58 IN | SYSTOLIC BLOOD PRESSURE: 126 MMHG | WEIGHT: 125 LBS | TEMPERATURE: 98 F | HEART RATE: 81 BPM | RESPIRATION RATE: 18 BRPM

## 2021-03-04 PROCEDURE — 76815 OB US LIMITED FETUS(S): CPT | Performed by: OBSTETRICS & GYNECOLOGY

## 2021-03-04 PROCEDURE — 59025 FETAL NON-STRESS TEST: CPT

## 2021-03-04 PROCEDURE — 99213 OFFICE O/P EST LOW 20 MIN: CPT

## 2021-03-04 NOTE — PROGRESS NOTES
Pt is a 38year old female admitted to TRG1/TRG1-A. Patient presents with:  R/o Rom: c/o large gush of clear fluid at midnight and scant amount of clear leaking during the day today. Also c/o mild abdominal pain. + fetal movement, denies bleeding.

## 2021-03-05 NOTE — NST
Nonstress Test   Patient: Phill Kate    Gestation: 35w4d    NST:       Variability: Moderate           Accelerations: Yes           Decelerations: None            Baseline: 135 BPM           Uterine Irritability: No           Contractions: Irre

## 2021-04-02 ENCOUNTER — HOSPITAL ENCOUNTER (OUTPATIENT)
Facility: HOSPITAL | Age: 36
Setting detail: OBSERVATION
Discharge: HOME | End: 2021-04-02
Attending: OBSTETRICS & GYNECOLOGY | Admitting: OBSTETRICS & GYNECOLOGY
Payer: COMMERCIAL

## 2021-04-02 VITALS
RESPIRATION RATE: 18 BRPM | DIASTOLIC BLOOD PRESSURE: 85 MMHG | HEIGHT: 58 IN | TEMPERATURE: 98 F | BODY MASS INDEX: 28.13 KG/M2 | HEART RATE: 84 BPM | SYSTOLIC BLOOD PRESSURE: 139 MMHG | WEIGHT: 134 LBS

## 2021-04-02 DIAGNOSIS — Z34.90 PREGNANCY: Primary | ICD-10-CM

## 2021-04-02 PROCEDURE — 82570 ASSAY OF URINE CREATININE: CPT | Performed by: OBSTETRICS & GYNECOLOGY

## 2021-04-02 PROCEDURE — 36415 COLL VENOUS BLD VENIPUNCTURE: CPT

## 2021-04-02 PROCEDURE — 59025 FETAL NON-STRESS TEST: CPT

## 2021-04-02 PROCEDURE — 84550 ASSAY OF BLOOD/URIC ACID: CPT | Performed by: OBSTETRICS & GYNECOLOGY

## 2021-04-02 PROCEDURE — 85025 COMPLETE CBC W/AUTO DIFF WBC: CPT | Performed by: OBSTETRICS & GYNECOLOGY

## 2021-04-02 PROCEDURE — 80053 COMPREHEN METABOLIC PANEL: CPT | Performed by: OBSTETRICS & GYNECOLOGY

## 2021-04-02 PROCEDURE — 99213 OFFICE O/P EST LOW 20 MIN: CPT

## 2021-04-02 PROCEDURE — 84156 ASSAY OF PROTEIN URINE: CPT | Performed by: OBSTETRICS & GYNECOLOGY

## 2021-04-02 RX ORDER — ACETAMINOPHEN 500 MG
1000 TABLET ORAL EVERY 6 HOURS PRN
Status: DISCONTINUED | OUTPATIENT
Start: 2021-04-02 | End: 2021-04-02

## 2021-04-02 NOTE — PROGRESS NOTES
Patients BP was checked with BP cuff from home and found to be off by more that 15 on diastolic reading. Patient now c/o mild headache. Dr. Marleny Hodges aware of above and on the phone with patient to discuss POC.   Per Dr. Marleny Hodges, Patients  is Brii Pock

## 2021-04-03 NOTE — PROGRESS NOTES
Home BP cuff tested against hospital BP cuff as requested per MD. Home BP cuff diastolic [id number] higher than hospital. Call Dr. Dylan Devlin if BPs 140/90 or greater. Written and verbal instructions given. Pt demonstrates understanding.  Ambulatory off departme

## 2021-04-03 NOTE — NST
Nonstress Test   Patient: Julee Kate    Gestation: 39w5d    NST:       Variability: Moderate           Accelerations: Yes           Decelerations: None            Baseline: 135 BPM           Uterine Irritability: No           Contractions: Not

## 2021-04-04 ENCOUNTER — HOSPITAL ENCOUNTER (INPATIENT)
Facility: HOSPITAL | Age: 36
LOS: 2 days | Discharge: HOME | End: 2021-04-06
Attending: OBSTETRICS & GYNECOLOGY | Admitting: OBSTETRICS & GYNECOLOGY
Payer: COMMERCIAL

## 2021-04-04 ENCOUNTER — ANESTHESIA (OUTPATIENT)
Dept: OBGYN UNIT | Facility: HOSPITAL | Age: 36
End: 2021-04-04
Payer: COMMERCIAL

## 2021-04-04 ENCOUNTER — ANESTHESIA EVENT (OUTPATIENT)
Dept: OBGYN UNIT | Facility: HOSPITAL | Age: 36
End: 2021-04-04
Payer: COMMERCIAL

## 2021-04-04 PROCEDURE — 82570 ASSAY OF URINE CREATININE: CPT | Performed by: OBSTETRICS & GYNECOLOGY

## 2021-04-04 PROCEDURE — 86780 TREPONEMA PALLIDUM: CPT | Performed by: OBSTETRICS & GYNECOLOGY

## 2021-04-04 PROCEDURE — 84550 ASSAY OF BLOOD/URIC ACID: CPT | Performed by: OBSTETRICS & GYNECOLOGY

## 2021-04-04 PROCEDURE — 99214 OFFICE O/P EST MOD 30 MIN: CPT

## 2021-04-04 PROCEDURE — 85025 COMPLETE CBC W/AUTO DIFF WBC: CPT | Performed by: OBSTETRICS & GYNECOLOGY

## 2021-04-04 PROCEDURE — 86850 RBC ANTIBODY SCREEN: CPT | Performed by: OBSTETRICS & GYNECOLOGY

## 2021-04-04 PROCEDURE — 86901 BLOOD TYPING SEROLOGIC RH(D): CPT | Performed by: OBSTETRICS & GYNECOLOGY

## 2021-04-04 PROCEDURE — 86900 BLOOD TYPING SEROLOGIC ABO: CPT | Performed by: OBSTETRICS & GYNECOLOGY

## 2021-04-04 PROCEDURE — 80053 COMPREHEN METABOLIC PANEL: CPT | Performed by: OBSTETRICS & GYNECOLOGY

## 2021-04-04 PROCEDURE — 84156 ASSAY OF PROTEIN URINE: CPT | Performed by: OBSTETRICS & GYNECOLOGY

## 2021-04-04 RX ORDER — LABETALOL HYDROCHLORIDE 5 MG/ML
20 INJECTION, SOLUTION INTRAVENOUS ONCE
Status: COMPLETED | OUTPATIENT
Start: 2021-04-04 | End: 2021-04-04

## 2021-04-04 RX ORDER — LABETALOL HYDROCHLORIDE 5 MG/ML
20 INJECTION, SOLUTION INTRAVENOUS ONCE
Status: COMPLETED | OUTPATIENT
Start: 2021-04-04 | End: 2021-04-05

## 2021-04-04 RX ORDER — BUPIVACAINE HCL/0.9 % NACL/PF 0.25 %
5 PLASTIC BAG, INJECTION (ML) EPIDURAL AS NEEDED
Status: DISCONTINUED | OUTPATIENT
Start: 2021-04-04 | End: 2021-04-06

## 2021-04-04 RX ORDER — ACETAMINOPHEN 500 MG
500 TABLET ORAL EVERY 6 HOURS PRN
Status: DISCONTINUED | OUTPATIENT
Start: 2021-04-04 | End: 2021-04-05

## 2021-04-04 RX ORDER — TERBUTALINE SULFATE 1 MG/ML
0.25 INJECTION, SOLUTION SUBCUTANEOUS AS NEEDED
Status: DISCONTINUED | OUTPATIENT
Start: 2021-04-04 | End: 2021-04-05 | Stop reason: HOSPADM

## 2021-04-04 RX ORDER — IBUPROFEN 600 MG/1
600 TABLET ORAL EVERY 6 HOURS PRN
Status: DISCONTINUED | OUTPATIENT
Start: 2021-04-04 | End: 2021-04-05

## 2021-04-04 RX ORDER — AMMONIA INHALANTS 0.04 G/.3ML
0.3 INHALANT RESPIRATORY (INHALATION) AS NEEDED
Status: DISCONTINUED | OUTPATIENT
Start: 2021-04-04 | End: 2021-04-05 | Stop reason: HOSPADM

## 2021-04-04 RX ORDER — SODIUM CHLORIDE, SODIUM LACTATE, POTASSIUM CHLORIDE, CALCIUM CHLORIDE 600; 310; 30; 20 MG/100ML; MG/100ML; MG/100ML; MG/100ML
INJECTION, SOLUTION INTRAVENOUS CONTINUOUS
Status: DISCONTINUED | OUTPATIENT
Start: 2021-04-04 | End: 2021-04-05 | Stop reason: HOSPADM

## 2021-04-04 RX ORDER — TRISODIUM CITRATE DIHYDRATE AND CITRIC ACID MONOHYDRATE 500; 334 MG/5ML; MG/5ML
30 SOLUTION ORAL AS NEEDED
Status: DISCONTINUED | OUTPATIENT
Start: 2021-04-04 | End: 2021-04-05 | Stop reason: HOSPADM

## 2021-04-04 RX ORDER — NALBUPHINE HCL 10 MG/ML
2.5 AMPUL (ML) INJECTION
Status: DISCONTINUED | OUTPATIENT
Start: 2021-04-04 | End: 2021-04-06

## 2021-04-04 RX ORDER — DEXTROSE, SODIUM CHLORIDE, SODIUM LACTATE, POTASSIUM CHLORIDE, AND CALCIUM CHLORIDE 5; .6; .31; .03; .02 G/100ML; G/100ML; G/100ML; G/100ML; G/100ML
INJECTION, SOLUTION INTRAVENOUS AS NEEDED
Status: DISCONTINUED | OUTPATIENT
Start: 2021-04-04 | End: 2021-04-05 | Stop reason: HOSPADM

## 2021-04-04 RX ORDER — MULTIVIT WITH MINERALS/LUTEIN
1000 TABLET ORAL DAILY
COMMUNITY

## 2021-04-04 RX ORDER — LABETALOL HYDROCHLORIDE 5 MG/ML
INJECTION, SOLUTION INTRAVENOUS
Status: COMPLETED
Start: 2021-04-04 | End: 2021-04-04

## 2021-04-04 RX ORDER — MULTIVIT-MIN/IRON/FOLIC ACID/K 18-600-40
CAPSULE ORAL
Status: ON HOLD | COMMUNITY
End: 2021-04-05

## 2021-04-04 RX ORDER — ONDANSETRON 2 MG/ML
4 INJECTION INTRAMUSCULAR; INTRAVENOUS EVERY 6 HOURS PRN
Status: DISCONTINUED | OUTPATIENT
Start: 2021-04-04 | End: 2021-04-05 | Stop reason: HOSPADM

## 2021-04-04 NOTE — PROGRESS NOTES
Pt here with complaints of \"losing my mucous plug at home at 2:30 pm  Denies any contractions. Triage process explained and pt verbalized understanding.

## 2021-04-05 ENCOUNTER — APPOINTMENT (OUTPATIENT)
Dept: CV DIAGNOSTICS | Facility: HOSPITAL | Age: 36
End: 2021-04-05
Attending: INTERNAL MEDICINE
Payer: COMMERCIAL

## 2021-04-05 ENCOUNTER — TELEPHONE (OUTPATIENT)
Dept: INTERNAL MEDICINE CLINIC | Facility: CLINIC | Age: 36
End: 2021-04-05

## 2021-04-05 PROCEDURE — 93306 TTE W/DOPPLER COMPLETE: CPT | Performed by: INTERNAL MEDICINE

## 2021-04-05 PROCEDURE — 88307 TISSUE EXAM BY PATHOLOGIST: CPT | Performed by: OBSTETRICS & GYNECOLOGY

## 2021-04-05 PROCEDURE — 85025 COMPLETE CBC W/AUTO DIFF WBC: CPT | Performed by: OBSTETRICS & GYNECOLOGY

## 2021-04-05 PROCEDURE — 0HQ9XZZ REPAIR PERINEUM SKIN, EXTERNAL APPROACH: ICD-10-PCS | Performed by: OBSTETRICS & GYNECOLOGY

## 2021-04-05 PROCEDURE — 99255 IP/OBS CONSLTJ NEW/EST HI 80: CPT | Performed by: INTERNAL MEDICINE

## 2021-04-05 RX ORDER — LABETALOL HYDROCHLORIDE 5 MG/ML
20 INJECTION, SOLUTION INTRAVENOUS ONCE
Status: COMPLETED | OUTPATIENT
Start: 2021-04-05 | End: 2021-04-05

## 2021-04-05 RX ORDER — METOCLOPRAMIDE HYDROCHLORIDE 5 MG/ML
10 INJECTION INTRAMUSCULAR; INTRAVENOUS ONCE
Status: COMPLETED | OUTPATIENT
Start: 2021-04-05 | End: 2021-04-05

## 2021-04-05 RX ORDER — IBUPROFEN 600 MG/1
600 TABLET ORAL EVERY 6 HOURS
Status: DISCONTINUED | OUTPATIENT
Start: 2021-04-05 | End: 2021-04-06

## 2021-04-05 RX ORDER — BISACODYL 10 MG
10 SUPPOSITORY, RECTAL RECTAL ONCE AS NEEDED
Status: DISCONTINUED | OUTPATIENT
Start: 2021-04-05 | End: 2021-04-06

## 2021-04-05 RX ORDER — CARBOPROST TROMETHAMINE 250 UG/ML
250 INJECTION, SOLUTION INTRAMUSCULAR ONCE
Status: COMPLETED | OUTPATIENT
Start: 2021-04-05 | End: 2021-04-05

## 2021-04-05 RX ORDER — LABETALOL 100 MG/1
100 TABLET, FILM COATED ORAL 2 TIMES DAILY
Status: DISCONTINUED | OUTPATIENT
Start: 2021-04-05 | End: 2021-04-06

## 2021-04-05 RX ORDER — ZOLPIDEM TARTRATE 5 MG/1
5 TABLET ORAL NIGHTLY PRN
Status: DISCONTINUED | OUTPATIENT
Start: 2021-04-05 | End: 2021-04-06

## 2021-04-05 RX ORDER — CHOLECALCIFEROL (VITAMIN D3) 25 MCG
1 TABLET,CHEWABLE ORAL DAILY
Status: DISCONTINUED | OUTPATIENT
Start: 2021-04-05 | End: 2021-04-06

## 2021-04-05 RX ORDER — ONDANSETRON 2 MG/ML
4 INJECTION INTRAMUSCULAR; INTRAVENOUS ONCE
Status: COMPLETED | OUTPATIENT
Start: 2021-04-05 | End: 2021-04-05

## 2021-04-05 RX ORDER — CARBOPROST TROMETHAMINE 250 UG/ML
INJECTION, SOLUTION INTRAMUSCULAR
Status: COMPLETED
Start: 2021-04-05 | End: 2021-04-05

## 2021-04-05 RX ORDER — LOPERAMIDE HYDROCHLORIDE 2 MG/1
4 CAPSULE ORAL 4 TIMES DAILY PRN
Status: DISCONTINUED | OUTPATIENT
Start: 2021-04-05 | End: 2021-04-06

## 2021-04-05 RX ORDER — LABETALOL 100 MG/1
50 TABLET, FILM COATED ORAL 2 TIMES DAILY
Status: DISCONTINUED | OUTPATIENT
Start: 2021-04-05 | End: 2021-04-05

## 2021-04-05 RX ORDER — METOCLOPRAMIDE HYDROCHLORIDE 5 MG/ML
INJECTION INTRAMUSCULAR; INTRAVENOUS
Status: DISPENSED
Start: 2021-04-05 | End: 2021-04-05

## 2021-04-05 RX ORDER — GARLIC EXTRACT 500 MG
1 CAPSULE ORAL DAILY
Status: DISCONTINUED | OUTPATIENT
Start: 2021-04-05 | End: 2021-04-06

## 2021-04-05 RX ORDER — DOCUSATE SODIUM 100 MG/1
100 CAPSULE, LIQUID FILLED ORAL
Status: DISCONTINUED | OUTPATIENT
Start: 2021-04-05 | End: 2021-04-06

## 2021-04-05 RX ORDER — ACETAMINOPHEN 325 MG/1
650 TABLET ORAL EVERY 6 HOURS PRN
Status: DISCONTINUED | OUTPATIENT
Start: 2021-04-05 | End: 2021-04-06

## 2021-04-05 RX ORDER — SIMETHICONE 80 MG
80 TABLET,CHEWABLE ORAL 3 TIMES DAILY PRN
Status: DISCONTINUED | OUTPATIENT
Start: 2021-04-05 | End: 2021-04-06

## 2021-04-05 NOTE — ANESTHESIA PROCEDURE NOTES
Labor Analgesia  Performed by: Key Jung MD  Authorized by: Key Jung MD       General Information and Staff    Start Time:  4/4/2021 9:49 PM  End Time:  4/4/2021 10:59 PM  Anesthesiologist:  Key Jung MD  Performed by:   Anesthesiologist

## 2021-04-05 NOTE — H&P
[facility]    PATIENT'S NAME: Eleno Al   ATTENDING PHYSICIAN: Kenji Rubio M.D.    PATIENT ACCOUNT#:   [de-identified]    LOCATION:  [address] [place] [address]  MEDICAL RECORD #:   [id number]       YOB: 1985  ADMISSION DATE: for her hypertension; after the patient discontinued taking the medication for her hypertension, her blood pressure became within normal limits. SOCIAL HISTORY:  The patient denied smoking cigarettes. The patient denied drinking alcoholic beverages. sulfate. Since Group B Streptococcus was isolated in a genital culture done on 03/11/2021, the patient will be started on Ampicillin via the IV route as per protocol for Group B Strep prophylaxis.   The patient is requesting epidural for pain relief during

## 2021-04-05 NOTE — PROGRESS NOTES
Imodium per month at 1020 for diarrhea. BP was 156/102. Cardiologist Dr. Tamera Duane here, Cardio echo is ordered, and he wants patient BP monitored q 4 h, if BP >160/110 follow the protocol.

## 2021-04-05 NOTE — PROGRESS NOTES
BP recheck @ 0909 was 162/95, Heart rate was 69. Patient feel nausea, epigastric pain and skip heart beat. Dr. Yolanda Balderas phoned, Reglan administered at [phone] as order. Cardiology consult send.

## 2021-04-05 NOTE — L&D DELIVERY NOTE
[facility]    PATIENT'S NAME: Tomy Best   ATTENDING PHYSICIAN: Teresa Martini M.D.    PATIENT ACCOUNT #: [de-identified] LOCATION:  [address] [place] [address]   MEDICAL RECORD #: [id number] YOB: 1985   ADMISSION DATE: 04/04/2021 SERA rectum were noted to be grossly within normal limits. A first-degree perineal tear was repaired in an interrupted fashion using 3-0 Vicryl suture after injecting 0.5% Naropin into this area.   Prior to injecting the local into this area, aspiration was don

## 2021-04-05 NOTE — PROGRESS NOTES
NURSING ADMISSION NOTE    Patient admitted via wheelchair. Oriented to room. Safety precautions initiated. Bed in low position. Call light in reach. Teaching initiated, care plan discussed.

## 2021-04-05 NOTE — PLAN OF CARE
Problem: Patient/Family Goals  Goal: Patient/Family Long Term Goal  Description: Patient's Long Term Goal: Uncomplicated vaginal delivery    Interventions:  VS per protocol  I&O  Ice chips and sips as tolerated  EFM per protocol  Maintain IV as ordered cultural beliefs/practices regarding lactation, letdown techniques, maternal food preferences.   - Assess mother's knowledge and previous experience with breast feeding.  - Provide information as needed about early infant feeding cues (e.g., rooting, lip sm caregiver to participate in  daily care. - Assess support systems available to mother/family.  - Provide /case management support as needed.   Outcome: Progressing

## 2021-04-05 NOTE — PLAN OF CARE
Problem: BIRTH - VAGINAL/ SECTION  Goal: Fetal and maternal status remain reassuring during the birth process  Description: INTERVENTIONS:  - Monitor vital signs  - Monitor fetal heart rate  - Monitor uterine activity  - Monitor labor progression scores as ordered  Patient scores pain a \"3\" or less  Multidisciplinary care   Nonpharmacologic comfort measures      - See additional Care Plan goals for specific interventions  Outcome: Completed     Problem: POSTPARTUM  Goal: Long Term Goal:Experience breast feeding and how to manage common lactation challenges. - Recommend avoidance of specific medications or substances incompatible with breast feeding.  - Assess and monitor for signs of nipple pain/trauma.   - Instruct and provide assistance with prop

## 2021-04-05 NOTE — ANESTHESIA PREPROCEDURE EVALUATION
PRE-OP EVALUATION    Patient Name: Jane Kate    Admit Diagnosis: preg state  Pregnancy    Pre-op Diagnosis: Intra-uterine pregnancy        Anesthesia Procedure: LABOR ANALGESIA    * No surgeons found in log *    Pre-op vitals reviewed.   Temp: fentaNYL 2mcg/ml & ropivacaine 0.15% epidural infusion, , ,         Outpatient Medications:   Cholecalciferol (VITAMIN D) 50 MCG (2000 UT) Oral Cap, Take by mouth., Disp: , Rfl: , 4/3/2021 at Unknown time  Ascorbic Acid (VITAMIN C) 1000 MG Oral Tab, Take Comment: socially prior to pregnancy       Drug use: No     Available pre-op labs reviewed. Lab Results   Component Value Date    WBC 9.2 04/04/2021    RBC 3.71 (L) 04/04/2021    HGB 12.2 04/04/2021    HCT 35.3 04/04/2021    MCV 95.1 04/04/2021    MCH 32.

## 2021-04-05 NOTE — PROGRESS NOTES
Patient's BP was 162/94 at 0804, repeat @ 0819 was 149/100, and 162/95 @ 0822. She felt nausea, and has diarrhea. Dr. Samuel Mcnally phoned, Zofran 4 mg administered @ 0214.

## 2021-04-05 NOTE — PLAN OF CARE
Problem: BIRTH - VAGINAL/ SECTION  Goal: Fetal and maternal status remain reassuring during the birth process  Description: INTERVENTIONS:  - Monitor vital signs  - Monitor fetal heart rate  - Monitor uterine activity  - Monitor labor progression assessment scores as ordered  Patient scores pain a \"3\" or less  Multidisciplinary care   Nonpharmacologic comfort measures      - See additional Care Plan goals for specific interventions  Outcome: Progressing

## 2021-04-05 NOTE — CONSULTS
[facility]  Cardiology Consultation    [facility] Yuri Mooneynar Patient Status:  Inpatient    1985 MRN [id number]   [facility] 2SW-J Attending Junito Gibbons Day # 1 PCP Abel Whyte MD     Reason for [facility] Coughing  Losartan                Coughing    Medications:    Current Facility-Administered Medications:   •  witch hazel-glycerin [facility]) pad, , Topical, PRN  •  simethicone (MYLICON) chewable tab 80 mg, 80 mg, Oral, TID PRN  •  Benzocaine-Menthol (61.7 kg)   SpO2 99%   Breastfeeding Yes   BMI 28.42 kg/m²   Temp (24hrs), Av °F (36.7 °C), Min:97.2 °F (36.2 °C), Max:98.7 °F (37.1 °C)       Intake/Output Summary (Last 24 hours) at 2021 1027  Last data filed at 2021 0400  Gross per 24 hour MD  4/5/2021  10:27 AM

## 2021-04-06 VITALS
HEIGHT: 58 IN | DIASTOLIC BLOOD PRESSURE: 79 MMHG | RESPIRATION RATE: 18 BRPM | WEIGHT: 136 LBS | TEMPERATURE: 98 F | OXYGEN SATURATION: 98 % | HEART RATE: 67 BPM | BODY MASS INDEX: 28.55 KG/M2 | SYSTOLIC BLOOD PRESSURE: 132 MMHG

## 2021-04-06 PROCEDURE — 99231 SBSQ HOSP IP/OBS SF/LOW 25: CPT | Performed by: INTERNAL MEDICINE

## 2021-04-06 RX ORDER — LABETALOL 100 MG/1
50 TABLET, FILM COATED ORAL 2 TIMES DAILY
Qty: 30 TABLET | Refills: 0 | Status: SHIPPED | OUTPATIENT
Start: 2021-04-06

## 2021-04-06 RX ORDER — LABETALOL 100 MG/1
50 TABLET, FILM COATED ORAL 2 TIMES DAILY
Status: DISCONTINUED | OUTPATIENT
Start: 2021-04-06 | End: 2021-04-06

## 2021-04-06 NOTE — PROGRESS NOTES
Labor Analgesia Follow Up Note    Patient underwent epidural anesthesia for labor analgesia,    Placenta Date/Time: 4/5/2021  2:09 AM    Delivery Date/Time[de-identified] 4/5/2021  1:49 AM    /73 (BP Location: Left arm)   Pulse 82   Temp 97.7 °F (36.5 °C) (Oral)

## 2021-04-06 NOTE — PROGRESS NOTES
PPD Number 0  S - Patient is doing fine.  Patient without c/o headache, blurred vision or right upper quadrant pain. Patient desires to go home on 04-06-21.   O - Afebrile VSS; B Positive  Lochia Minimal   A - Stable  P - 1.   Consult from cardiology servi

## 2021-04-06 NOTE — PROGRESS NOTES
[facility] Cardiology Progress Note    Patient seen and examined.  Chart reviewed.  Discussed with RN. No chest pain or shortness of breath. Nausea resolved. Patient feels much better today. /73 (BP Location: Left arm)   Pulse 82   Temp 97.7 °F (36. [facility]) injection 4 mg, 4 mg, Intravenous, Once  [COMPLETED] Metoclopramide HCl (REGLAN) injection 10 mg, 10 mg, Intravenous, Once  [] Metoclopramide HCl (REGLAN) 5 MG/ML injection, , ,   Loperamide HCl (IMODIUM) cap 4 mg, 4 mg, Oral, QID PRN  [COM

## 2021-04-08 ENCOUNTER — TELEPHONE (OUTPATIENT)
Dept: OBGYN UNIT | Facility: HOSPITAL | Age: 36
End: 2021-04-08

## 2021-04-08 NOTE — PROGRESS NOTES
04/08/21 1044   Cradle Call Follow up   Cradle call follow up date 04/08/21   Follow up needed Completed   Hypertension or Preeclampsia during pregnancy or delivery Yes   Did patient go home on any antihypertensive meds?  Yes   Does patient have a follow

## 2021-04-16 ENCOUNTER — OFFICE VISIT (OUTPATIENT)
Dept: CARDIOLOGY | Age: 36
End: 2021-04-16

## 2021-04-16 VITALS
DIASTOLIC BLOOD PRESSURE: 62 MMHG | WEIGHT: 119 LBS | HEIGHT: 59 IN | SYSTOLIC BLOOD PRESSURE: 110 MMHG | BODY MASS INDEX: 23.99 KG/M2 | HEART RATE: 78 BPM

## 2021-04-16 DIAGNOSIS — I10 ESSENTIAL HYPERTENSION: Primary | ICD-10-CM

## 2021-04-16 DIAGNOSIS — E78.2 MIXED HYPERLIPIDEMIA: ICD-10-CM

## 2021-04-16 PROCEDURE — 3078F DIAST BP <80 MM HG: CPT | Performed by: INTERNAL MEDICINE

## 2021-04-16 PROCEDURE — 3074F SYST BP LT 130 MM HG: CPT | Performed by: INTERNAL MEDICINE

## 2021-04-16 PROCEDURE — 99213 OFFICE O/P EST LOW 20 MIN: CPT | Performed by: INTERNAL MEDICINE

## 2021-04-16 RX ORDER — PRENATAL VIT/IRON FUM/FOLIC AC 27 MG-1 MG
TABLET ORAL
COMMUNITY

## 2021-04-16 RX ORDER — FUROSEMIDE 20 MG/1
TABLET ORAL
COMMUNITY
Start: 2021-04-08 | End: 2021-04-16 | Stop reason: SDUPTHER

## 2021-04-16 RX ORDER — LABETALOL 100 MG/1
100 TABLET, FILM COATED ORAL 2 TIMES DAILY
COMMUNITY
End: 2021-04-16 | Stop reason: DRUGHIGH

## 2021-04-16 RX ORDER — LABETALOL 200 MG/1
200 TABLET, FILM COATED ORAL 2 TIMES DAILY
Qty: 180 TABLET | Refills: 3 | Status: SHIPPED | OUTPATIENT
Start: 2021-04-16

## 2021-04-16 RX ORDER — GARLIC EXTRACT 500 MG
1 CAPSULE ORAL
COMMUNITY

## 2021-04-16 RX ORDER — LABETALOL 100 MG/1
TABLET, FILM COATED ORAL
COMMUNITY
Start: 2021-04-06 | End: 2021-04-16 | Stop reason: SDUPTHER

## 2021-04-16 ASSESSMENT — ENCOUNTER SYMPTOMS
CHILLS: 0
HEMATOCHEZIA: 0
COUGH: 0
FEVER: 0
HEMOPTYSIS: 0
ALLERGIC/IMMUNOLOGIC COMMENTS: NO NEW FOOD ALLERGIES
BRUISES/BLEEDS EASILY: 0
SUSPICIOUS LESIONS: 0
WEIGHT GAIN: 0
HEADACHES: 1
WEIGHT LOSS: 0

## 2021-04-16 ASSESSMENT — PATIENT HEALTH QUESTIONNAIRE - PHQ9
SUM OF ALL RESPONSES TO PHQ9 QUESTIONS 1 AND 2: 0
CLINICAL INTERPRETATION OF PHQ2 SCORE: NO FURTHER SCREENING NEEDED
2. FEELING DOWN, DEPRESSED OR HOPELESS: NOT AT ALL
CLINICAL INTERPRETATION OF PHQ9 SCORE: NO FURTHER SCREENING NEEDED
SUM OF ALL RESPONSES TO PHQ9 QUESTIONS 1 AND 2: 0
1. LITTLE INTEREST OR PLEASURE IN DOING THINGS: NOT AT ALL

## 2021-05-14 ENCOUNTER — APPOINTMENT (OUTPATIENT)
Dept: CARDIOLOGY | Age: 36
End: 2021-05-14

## 2021-05-28 ENCOUNTER — OFFICE VISIT (OUTPATIENT)
Dept: CARDIOLOGY | Age: 36
End: 2021-05-28

## 2021-05-28 RX ORDER — CEPHALEXIN 500 MG/1
500 CAPSULE ORAL 4 TIMES DAILY
COMMUNITY
Start: 2021-05-17

## 2021-06-15 ENCOUNTER — TELEPHONE (OUTPATIENT)
Dept: CARDIOLOGY | Age: 36
End: 2021-06-15

## 2021-10-06 ENCOUNTER — APPOINTMENT (OUTPATIENT)
Dept: CARDIOLOGY | Age: 36
End: 2021-10-06

## 2021-11-22 ENCOUNTER — TELEPHONE (OUTPATIENT)
Dept: CARDIOLOGY | Age: 36
End: 2021-11-22

## 2022-01-11 ENCOUNTER — TELEMEDICINE (OUTPATIENT)
Dept: INTERNAL MEDICINE CLINIC | Facility: CLINIC | Age: 37
End: 2022-01-11
Payer: COMMERCIAL

## 2022-01-11 DIAGNOSIS — U07.1 COVID: Primary | ICD-10-CM

## 2022-01-11 DIAGNOSIS — R11.0 NAUSEA: ICD-10-CM

## 2022-01-11 DIAGNOSIS — R06.02 SOB (SHORTNESS OF BREATH): ICD-10-CM

## 2022-01-11 DIAGNOSIS — J01.10 ACUTE NON-RECURRENT FRONTAL SINUSITIS: ICD-10-CM

## 2022-01-11 PROCEDURE — 99213 OFFICE O/P EST LOW 20 MIN: CPT | Performed by: FAMILY MEDICINE

## 2022-01-11 RX ORDER — ONDANSETRON 4 MG/1
4 TABLET, FILM COATED ORAL EVERY 8 HOURS PRN
Qty: 20 TABLET | Refills: 0 | Status: SHIPPED | OUTPATIENT
Start: 2022-01-11

## 2022-01-11 RX ORDER — AZITHROMYCIN 250 MG/1
TABLET, FILM COATED ORAL
Qty: 6 TABLET | Refills: 0 | Status: SHIPPED | OUTPATIENT
Start: 2022-01-11 | End: 2022-01-16

## 2022-01-11 NOTE — PROGRESS NOTES
Virtual Video Check-In     This visit is conducted using Telemedicine with live, interactive video and audio.     Cloria Baumgarten, who has verified his/her identification by name and , verbally consents to a Virtual/Telephone Check-In visit on 0 Inhibitors          OTHER (SEE COMMENTS)    Comment:Dry cough  Calamari Oil [Squid*        Comment:rash  Lisinopril              Coughing  Losartan                Coughing    Current Outpatient Medications   Medication Sig Dispense Refill   • Labetalol HCl as she speaks.   LUNGS: regular breathing rate and effort with no audible respiratory distress  NEURO: A&Ox3  PSYCH: pleasant mood and affect, appropriate judgement and insight    ASSESSMENT/PLAN:    Sob (shortness of breath)  Covid  (primary encounter diag -Push fluids, tea with honey and plenty of rest   -Limit dairy, to avoid increased congestion  -OTC Tylenol/Ibuprofen   -Flonase OTC 2 sprays each nostril daily  -Sinus rinses, saline nasal sprays or netti pot.  - azithromycin (ZITHROMAX Z-KHANH) 250 MG Or

## 2022-05-17 ENCOUNTER — TELEPHONE (OUTPATIENT)
Dept: INTERNAL MEDICINE CLINIC | Facility: CLINIC | Age: 37
End: 2022-05-17

## 2022-05-17 NOTE — TELEPHONE ENCOUNTER
Pt called wanting to know if she should get her covid booster shot. Pt had her first covid vaccine and had a reaction to it, costochondritis. Pt states her pharmacy rec she contacts her pcp to see if it is rec she gets her booster. Pt is traveling soon and worried ab getting covid again, pt is worried to get booster bc of her reaction to [place]. Please advise.

## 2022-05-18 NOTE — TELEPHONE ENCOUNTER
The booster makes sense, especially if she will be traveling    most people tolerate the booster better than the first series

## 2022-06-13 ENCOUNTER — TELEPHONE (OUTPATIENT)
Dept: INTERNAL MEDICINE CLINIC | Facility: CLINIC | Age: 37
End: 2022-06-13

## 2022-06-13 ENCOUNTER — OFFICE VISIT (OUTPATIENT)
Dept: INTERNAL MEDICINE CLINIC | Facility: CLINIC | Age: 37
End: 2022-06-13
Payer: COMMERCIAL

## 2022-06-13 VITALS
TEMPERATURE: 98 F | BODY MASS INDEX: 22.21 KG/M2 | HEIGHT: 58 IN | RESPIRATION RATE: 20 BRPM | DIASTOLIC BLOOD PRESSURE: 78 MMHG | SYSTOLIC BLOOD PRESSURE: 122 MMHG | OXYGEN SATURATION: 98 % | HEART RATE: 78 BPM | WEIGHT: 105.81 LBS

## 2022-06-13 DIAGNOSIS — R05.9 COUGH: Primary | ICD-10-CM

## 2022-06-13 PROCEDURE — 3074F SYST BP LT 130 MM HG: CPT | Performed by: FAMILY MEDICINE

## 2022-06-13 PROCEDURE — 99213 OFFICE O/P EST LOW 20 MIN: CPT | Performed by: FAMILY MEDICINE

## 2022-06-13 PROCEDURE — 3008F BODY MASS INDEX DOCD: CPT | Performed by: FAMILY MEDICINE

## 2022-06-13 PROCEDURE — 3078F DIAST BP <80 MM HG: CPT | Performed by: FAMILY MEDICINE

## 2022-06-13 RX ORDER — NIFEDIPINE 30 MG/1
30 TABLET, EXTENDED RELEASE ORAL DAILY
COMMUNITY
Start: 2022-03-22 | End: 2022-06-13

## 2022-06-13 RX ORDER — NIFEDIPINE 60 MG/1
TABLET, EXTENDED RELEASE ORAL
COMMUNITY
Start: 2022-06-12 | End: 2022-06-13

## 2022-06-13 RX ORDER — NIFEDIPINE 60 MG/1
60 TABLET, EXTENDED RELEASE ORAL DAILY
Refills: 0 | COMMUNITY
Start: 2022-06-13

## 2022-06-13 RX ORDER — PREDNISONE 10 MG/1
TABLET ORAL
Qty: 20 TABLET | Refills: 0 | Status: SHIPPED | OUTPATIENT
Start: 2022-06-13

## 2022-06-13 RX ORDER — LABETALOL 100 MG/1
100 TABLET, FILM COATED ORAL 2 TIMES DAILY
Qty: 30 TABLET | Refills: 0 | COMMUNITY
Start: 2022-06-13

## 2022-06-13 RX ORDER — AZITHROMYCIN 250 MG/1
TABLET, FILM COATED ORAL
Qty: 6 TABLET | Refills: 0 | Status: SHIPPED | OUTPATIENT
Start: 2022-06-13 | End: 2022-06-18

## 2022-06-13 NOTE — TELEPHONE ENCOUNTER
Pt scheduled for cough and congestion. Two negative covid tests last week. Per pt her kids have hand foot and mouth virus. Pt requesting to be seen in office.      Please advise     Future Appointments   Date Time Provider Armando Angela   6/13/2022  1:15 PM Rochell Cushing, MD [facility] [place]

## 2023-03-09 ENCOUNTER — OFFICE VISIT (OUTPATIENT)
Dept: INTERNAL MEDICINE CLINIC | Facility: CLINIC | Age: 38
End: 2023-03-09
Payer: COMMERCIAL

## 2023-03-09 ENCOUNTER — TELEPHONE (OUTPATIENT)
Dept: INTERNAL MEDICINE CLINIC | Facility: CLINIC | Age: 38
End: 2023-03-09

## 2023-03-09 VITALS
BODY MASS INDEX: 23.17 KG/M2 | DIASTOLIC BLOOD PRESSURE: 80 MMHG | TEMPERATURE: 98 F | OXYGEN SATURATION: 98 % | HEIGHT: 58 IN | WEIGHT: 110.38 LBS | SYSTOLIC BLOOD PRESSURE: 132 MMHG | HEART RATE: 64 BPM

## 2023-03-09 DIAGNOSIS — I10 BENIGN ESSENTIAL HYPERTENSION: Primary | ICD-10-CM

## 2023-03-09 PROCEDURE — 99213 OFFICE O/P EST LOW 20 MIN: CPT | Performed by: FAMILY MEDICINE

## 2023-03-09 PROCEDURE — 3075F SYST BP GE 130 - 139MM HG: CPT | Performed by: FAMILY MEDICINE

## 2023-03-09 PROCEDURE — 3008F BODY MASS INDEX DOCD: CPT | Performed by: FAMILY MEDICINE

## 2023-03-09 PROCEDURE — 3079F DIAST BP 80-89 MM HG: CPT | Performed by: FAMILY MEDICINE

## 2023-03-09 RX ORDER — LABETALOL 100 MG/1
200 TABLET, FILM COATED ORAL 2 TIMES DAILY
Qty: 30 TABLET | Refills: 0 | COMMUNITY
Start: 2023-03-09

## 2023-03-09 NOTE — TELEPHONE ENCOUNTER
t is complaining of dizziness, Nausea, Headache Elevated B/P for 3days- denies fever or other symptoms-  Apt scheduled for tomorrow- please call to triage to see if it is ok to wait. Nora Jack

## 2023-03-09 NOTE — TELEPHONE ENCOUNTER
Pt has appt w/TO 8:30am 3/10 (tomorrow)    Spoke with pt. Pt stated she's had a migraine since Sunday night. Sunday night to Monday pt felt left arm pain. Left arm pain subsided, per pt now it just feels \"weaker\"    Everyday she's woken with a migraine. Takes Excedrin with no relief. Will alternate tylenol/ibuprofen. She said the HA radiates from the back of head to eyes. BP has been elevated since Sunday. She states she takes BP at night, systolic ranging from 182-867. Diastolic . No other s/s. No congestion, no cough, no fever.  Denies CP/SOB    Please advise if OK for pt to wait till appt tomorrow or should she proceed to ER?

## 2023-03-09 NOTE — TELEPHONE ENCOUNTER
Future Appointments   Date Time Provider Armando Miller   3/9/2023  1:45 PM Gary Amin MD [facility] [place]

## 2023-03-10 LAB
ABSOLUTE BASOPHILS: 68 CELLS/UL (ref 0–200)
ABSOLUTE EOSINOPHILS: 137 CELLS/UL (ref 15–500)
ABSOLUTE LYMPHOCYTES: 1991 CELLS/UL (ref 850–3900)
ABSOLUTE MONOCYTES: 418 CELLS/UL (ref 200–950)
ABSOLUTE NEUTROPHILS: 4986 CELLS/UL (ref 1500–7800)
ALBUMIN/GLOBULIN RATIO: 1.8 (CALC) (ref 1–2.5)
ALBUMIN: 4.6 G/DL (ref 3.6–5.1)
ALKALINE PHOSPHATASE: 58 U/L (ref 31–125)
ALT: 10 U/L (ref 6–29)
AST: 16 U/L (ref 10–30)
BASOPHILS: 0.9 %
BILIRUBIN, TOTAL: 0.7 MG/DL (ref 0.2–1.2)
BUN: 16 MG/DL (ref 7–25)
CALCIUM: 9.5 MG/DL (ref 8.6–10.2)
CARBON DIOXIDE: 27 MMOL/L (ref 20–32)
CHLORIDE: 102 MMOL/L (ref 98–110)
CHOL/HDLC RATIO: 2.4 (CALC)
CHOLESTEROL, TOTAL: 216 MG/DL
CREATININE: 0.77 MG/DL (ref 0.5–0.97)
EGFR: 101 ML/MIN/1.73M2
EOSINOPHILS: 1.8 %
GLOBULIN: 2.6 G/DL (CALC) (ref 1.9–3.7)
GLUCOSE: 99 MG/DL (ref 65–139)
HDL CHOLESTEROL: 90 MG/DL
HEMATOCRIT: 37.4 % (ref 35–45)
HEMOGLOBIN: 12.9 G/DL (ref 11.7–15.5)
LDL-CHOLESTEROL: 109 MG/DL (CALC)
LYMPHOCYTES: 26.2 %
MCH: 31.6 PG (ref 27–33)
MCHC: 34.5 G/DL (ref 32–36)
MCV: 91.7 FL (ref 80–100)
MONOCYTES: 5.5 %
MPV: 9.7 FL (ref 7.5–12.5)
NEUTROPHILS: 65.6 %
NON-HDL CHOLESTEROL: 126 MG/DL (CALC)
PLATELET COUNT: 351 THOUSAND/UL (ref 140–400)
POTASSIUM: 3.7 MMOL/L (ref 3.5–5.3)
PROTEIN, TOTAL: 7.2 G/DL (ref 6.1–8.1)
RDW: 12 % (ref 11–15)
RED BLOOD CELL COUNT: 4.08 MILLION/UL (ref 3.8–5.1)
SODIUM: 136 MMOL/L (ref 135–146)
TRIGLYCERIDES: 84 MG/DL
TSH: 1.13 MIU/L
WHITE BLOOD CELL COUNT: 7.6 THOUSAND/UL (ref 3.8–10.8)

## 2023-04-11 ENCOUNTER — HOSPITAL ENCOUNTER (OUTPATIENT)
Dept: ULTRASOUND IMAGING | Age: 38
Discharge: HOME | End: 2023-04-11
Attending: OBSTETRICS & GYNECOLOGY
Payer: COMMERCIAL

## 2023-04-11 ENCOUNTER — HOSPITAL ENCOUNTER (OUTPATIENT)
Dept: MAMMOGRAPHY | Age: 38
Discharge: HOME | End: 2023-04-11
Attending: OBSTETRICS & GYNECOLOGY
Payer: COMMERCIAL

## 2023-04-11 DIAGNOSIS — N63.0 PALPABLE MASS OF BREAST: ICD-10-CM

## 2023-04-11 PROCEDURE — 77066 DX MAMMO INCL CAD BI: CPT | Performed by: OBSTETRICS & GYNECOLOGY

## 2023-04-11 PROCEDURE — 77062 BREAST TOMOSYNTHESIS BI: CPT | Performed by: OBSTETRICS & GYNECOLOGY

## 2023-04-11 PROCEDURE — 76642 ULTRASOUND BREAST LIMITED: CPT | Performed by: OBSTETRICS & GYNECOLOGY

## 2023-09-14 NOTE — PROGRESS NOTES
Dr. Chace Gordillo on the phone with patient to re-evaluate. Order received to treat HA with tylenol and re-check BP when HA has resolved. on the discharge service for the patient. I have reviewed and made amendments to the documentation where necessary.

## 2023-12-27 ENCOUNTER — TELEPHONE (OUTPATIENT)
Dept: INTERNAL MEDICINE CLINIC | Facility: CLINIC | Age: 38
End: 2023-12-27

## 2023-12-27 ENCOUNTER — TELEMEDICINE (OUTPATIENT)
Dept: INTERNAL MEDICINE CLINIC | Facility: CLINIC | Age: 38
End: 2023-12-27
Payer: COMMERCIAL

## 2023-12-27 DIAGNOSIS — U07.1 COVID-19 VIRUS INFECTION: Primary | ICD-10-CM

## 2023-12-27 PROCEDURE — 99213 OFFICE O/P EST LOW 20 MIN: CPT | Performed by: FAMILY MEDICINE

## 2023-12-27 RX ORDER — NIFEDIPINE 60 MG/1
120 TABLET, EXTENDED RELEASE ORAL DAILY
Qty: 60 TABLET | Refills: 0 | Status: SHIPPED | OUTPATIENT
Start: 2023-12-27

## 2023-12-27 NOTE — PROGRESS NOTES
Samanta Reaves is a 43year old female. HPI:   Patient has agreed to do a  video encounter w me today in lieu of a regular appointment in regards to her COVID        started 3 d ago        tested + COVID      BP going up as well  150/115 once     feels SOB at night    trying to keep hydrated    had vomited but no diarrhea    ok at present    is using theraflu         Current Outpatient Medications   Medication Sig Dispense Refill    labetalol 100 MG Oral Tab Take 2 tablets (200 mg total) by mouth in the morning and 2 tablets (200 mg total) before bedtime. 30 tablet 0    NIFEdipine ER 60 MG Oral Tablet 24 Hr Take 1 tablet (60 mg total) by mouth daily. 0    predniSONE 10 MG Oral Tab 4 PO for 2 days,  3 PO for 2 days   2 PO for 2 days   1 PO for 2 days (Patient not taking: Reported on 3/9/2023) 20 tablet 0    ondansetron (ZOFRAN) 4 mg tablet Take 1 tablet (4 mg total) by mouth every 8 (eight) hours as needed for Nausea. (Patient not taking: Reported on 3/9/2023) 20 tablet 0    Ascorbic Acid (VITAMIN C) 1000 MG Oral Tab Take 1 tablet (1,000 mg total) by mouth daily. Acidophilus/Pectin Oral Cap Take 1 capsule by mouth daily. (Patient not taking: Reported on 6/13/2022)      Prenatal Vit-Fe Fumarate-FA (PRENATAL/FOLIC ACID) Oral Tab Take by mouth.  (Patient not taking: Reported on 3/9/2023)        Past Medical History:   Diagnosis Date    Anemia     Anorexia     RESOLVED    Chronic rhinitis     Essential hypertension     Essential HTN     Former smoker     History of chicken pox     Ovarian cyst     Ovarian cyst     Pertussis 11/7/2016      Social History:  Social History     Socioeconomic History    Marital status:    Tobacco Use    Smoking status: Former     Packs/day: 0.10     Years: 3.00     Additional pack years: 0.00     Total pack years: 0.30     Types: Cigarettes    Smokeless tobacco: Former     Quit date: 2000    Tobacco comments:     2-3 cigarettes when out on weekends   Vaping Use    Vaping Use: Never used   Substance and Sexual Activity    Alcohol use: Not Currently     Comment: socially prior to pregnancy     Drug use: No    Sexual activity: Yes     Partners: Male     Comment: in relationship since 2010   Other Topics Concern    Caffeine Concern No    Exercise Yes     Comment: 5x/week. Social History Narrative    ** Merged History Encounter **             REVIEW OF SYSTEMS:   GENERAL HEALTH: feels well otherwise    EXAM:   LMP 03/31/2023   GENERAL: Patient appears to be their normal self over the video encounter. Appropriate w conversation. Does not appear to be in any respiratory distress   speech is nl      ASSESSMENT AND PLAN:   1. COVID-19 virus infection  D/w pt her s/s    stop theraflu as is raising the BP     ok to use verapamil BID for now     hydrate   rest   paxlovid ordered   call if s/s are not better         The patient indicates understanding of these issues and agrees to the plan. Stephanie Velez

## 2023-12-27 NOTE — TELEPHONE ENCOUNTER
Appt scheduled.     Future Appointments   Date Time Provider Armando Miller   12/27/2023  4:15 PM Neftaly Beatty MD [facility] [place]

## 2023-12-27 NOTE — TELEPHONE ENCOUNTER
Pt tested positive for covid on 12/25. Pt was vomiting last night and her SBP is between 150 -180. Pt would like to know if pcp can send any medication to help.

## 2024-03-20 NOTE — TELEPHONE ENCOUNTER
MCM sent to patient to schedule OV     Requesting   Name from pharmacy: NIFEDIPINE 60MG ER (XL/OS) TABLETS          Will file in chart as: NIFEDIPINE ER 60 MG Oral Tablet 24 Hr    Sig: TAKE 2 TABLETS(120 MG) BY MOUTH DAILY    Disp: 60 tablet    Refills: 0 (Pharmacy requested: Not specified)    Start: 3/19/2024    Class: Normal    Non-formulary    Last ordered: 2 months ago (12/27/2023) by Estevan Rodriguez MD    Last refill: 12/27/2023    Rx #: [id number]    Hypertension Medications Protocol [id number]/19/2024 07:57 PM   Protocol Details CMP or BMP in past 12 months    EGFRCR or GFRNAA > 50    Last BP reading less than 140/90    In person appointment or virtual visit in the past 12 mos or appointment in next 3 mos        LOV: 3/9/2023  RTC: none noted   Last Relevant Labs: 3/9/2023  Filled: 12/27/2023 #60 with 0 refills    No future appointments.

## 2024-03-24 RX ORDER — NIFEDIPINE 60 MG/1
120 TABLET, EXTENDED RELEASE ORAL DAILY
Qty: 60 TABLET | Refills: 0 | OUTPATIENT
Start: 2024-03-24

## 2024-04-01 ENCOUNTER — TELEPHONE (OUTPATIENT)
Dept: INTERNAL MEDICINE CLINIC | Facility: CLINIC | Age: 39
End: 2024-04-01

## 2024-04-01 DIAGNOSIS — Z00.00 LABORATORY EXAM ORDERED AS PART OF ROUTINE GENERAL MEDICAL EXAMINATION: Primary | ICD-10-CM

## 2024-04-02 RX ORDER — NIFEDIPINE 60 MG/1
120 TABLET, EXTENDED RELEASE ORAL DAILY
Qty: 60 TABLET | Refills: 0 | OUTPATIENT
Start: 2024-04-02

## 2024-04-02 NOTE — TELEPHONE ENCOUNTER
[facility] sent to pt to schedule CPX/BP f/u appt.    Nifedipine ER 60 mg    Hypertension Medications Protocol [id number]/01/2024 05:58 PM   Protocol Details CMP or BMP in past 12 months    EGFRCR or GFRNAA > 50      Filled 12-27-23  Qty 60  0 refills  No future appointments.  LOV 03-09-23 TO

## 2024-06-04 ENCOUNTER — OFFICE VISIT (OUTPATIENT)
Dept: INTERNAL MEDICINE CLINIC | Facility: CLINIC | Age: 39
End: 2024-06-04
Payer: COMMERCIAL

## 2024-06-04 VITALS
HEIGHT: 58 IN | WEIGHT: 111.38 LBS | HEART RATE: 80 BPM | DIASTOLIC BLOOD PRESSURE: 60 MMHG | OXYGEN SATURATION: 99 % | TEMPERATURE: 98 F | SYSTOLIC BLOOD PRESSURE: 112 MMHG | BODY MASS INDEX: 23.38 KG/M2 | RESPIRATION RATE: 16 BRPM

## 2024-06-04 DIAGNOSIS — N39.0 ACUTE UTI: Primary | ICD-10-CM

## 2024-06-04 LAB
BILIRUBIN: NEGATIVE
GLUCOSE (URINE DIPSTICK): NEGATIVE MG/DL
KETONES (URINE DIPSTICK): NEGATIVE MG/DL
MULTISTIX LOT#: ABNORMAL NUMERIC
NITRITE, URINE: NEGATIVE
PH, URINE: 6 (ref 4.5–8)
PROTEIN (URINE DIPSTICK): NEGATIVE MG/DL
SPECIFIC GRAVITY: 1.02 (ref 1–1.03)
URINE-COLOR: YELLOW
UROBILINOGEN,SEMI-QN: 0.2 MG/DL (ref 0–1.9)

## 2024-06-04 PROCEDURE — 3078F DIAST BP <80 MM HG: CPT | Performed by: FAMILY MEDICINE

## 2024-06-04 PROCEDURE — 87186 SC STD MICRODIL/AGAR DIL: CPT | Performed by: FAMILY MEDICINE

## 2024-06-04 PROCEDURE — 99213 OFFICE O/P EST LOW 20 MIN: CPT | Performed by: FAMILY MEDICINE

## 2024-06-04 PROCEDURE — 3008F BODY MASS INDEX DOCD: CPT | Performed by: FAMILY MEDICINE

## 2024-06-04 PROCEDURE — 81003 URINALYSIS AUTO W/O SCOPE: CPT | Performed by: FAMILY MEDICINE

## 2024-06-04 PROCEDURE — 87086 URINE CULTURE/COLONY COUNT: CPT | Performed by: FAMILY MEDICINE

## 2024-06-04 PROCEDURE — 87088 URINE BACTERIA CULTURE: CPT | Performed by: FAMILY MEDICINE

## 2024-06-04 PROCEDURE — 3074F SYST BP LT 130 MM HG: CPT | Performed by: FAMILY MEDICINE

## 2024-06-04 RX ORDER — ACETAMINOPHEN 160 MG
2000 TABLET,DISINTEGRATING ORAL DAILY
COMMUNITY

## 2024-06-04 RX ORDER — CEPHALEXIN 500 MG/1
500 CAPSULE ORAL 2 TIMES DAILY
Qty: 14 CAPSULE | Refills: 0 | Status: SHIPPED | OUTPATIENT
Start: 2024-06-04 | End: 2024-06-11

## 2024-06-04 RX ORDER — GARLIC EXTRACT 500 MG
1 CAPSULE ORAL DAILY
COMMUNITY

## 2024-06-04 NOTE — PROGRESS NOTES
CHIEF COMPLAINT:     Chief Complaint   Patient presents with    UTI     Frequency, lower abdominal pain, hx of cystitis.        HPI:   Arnie Kate is a 38 year old female who presents with symptoms of UTI. Complaining of urinary frequency, urgency, dysuria for last 3 days. Symptoms have been not improving since onset.  Treatments tried: drinking fluids, AZO.  Associated symptoms: Hx of UTI's and cystitis.   Denies flank pain, fever, hematuria, nausea, or vomiting.  Denies vaginal discharge or itching.     Current Outpatient Medications   Medication Sig Dispense Refill    acidophilus-pectin Oral Cap Take 1 capsule by mouth daily.      cholecalciferol 50 MCG (2000 UT) Oral Cap Take 1 capsule (2,000 Units total) by mouth daily.      cephalexin (KEFLEX) 500 MG Oral Cap Take 1 capsule (500 mg total) by mouth 2 (two) times daily for 7 days. 14 capsule 0    NIFEdipine ER 60 MG Oral Tablet 24 Hr Take 2 tablets (120 mg total) by mouth daily. 60 tablet 0    labetalol 100 MG Oral Tab Take 2 tablets (200 mg total) by mouth in the morning and 2 tablets (200 mg total) before bedtime. 30 tablet 0    Ascorbic Acid (VITAMIN C) 1000 MG Oral Tab Take 1 tablet (1,000 mg total) by mouth daily. (Patient not taking: Reported on 6/4/2024)        Past Medical History:    Anemia    Anorexia    RESOLVED    Chronic rhinitis    Essential hypertension    Essential HTN     Former smoker    History of chicken pox    Ovarian cyst    Ovarian cyst    Pertussis      Social History:  Social History     Socioeconomic History    Marital status:    Tobacco Use    Smoking status: Former     Current packs/day: 0.10     Average packs/day: 0.1 packs/day for 3.0 years (0.3 ttl pk-yrs)     Types: Cigarettes    Smokeless tobacco: Former     Quit date: 2000    Tobacco comments:     2-3 cigarettes when out on weekends   Vaping Use    Vaping status: Never Used   Substance and Sexual Activity    Alcohol use: Not Currently     Comment: socially  prior to pregnancy     Drug use: No    Sexual activity: Yes     Partners: Male     Comment: in relationship since 2010   Other Topics Concern    Caffeine Concern No    Exercise Yes     Comment: 5x/week.    Social History Narrative    ** Merged History Encounter **          Social Determinants of Health      Received from [facility], [facility]    Social Connections    Received from [facility], [facility]    Housing Stability         REVIEW OF SYSTEMS:   GENERAL: Denies fever, chills, or body aches  SKIN: no rashes, no skin wounds or ulcers.  EYES:denies blurred vision or double vision  HEENT: no congestion, rhinorrhea, sore throat or ear pain  CARDIOVASCULAR: denies chest pain or palpitations  LUNGS: denies shortness of breath, cough, or wheezing  GI: See HPI. No N/V/C/D.   : See HPI.  NEURO: no headaches.    EXAM:   /60 (BP Location: Left arm, Patient Position: Sitting, Cuff Size: adult)   Pulse 80   Temp 97.5 °F (36.4 °C) (Temporal)   Resp 16   Ht 4' 10\" (1.473 m)   Wt 111 lb 6.4 oz (50.5 kg)   SpO2 99%   BMI 23.28 kg/m²   GENERAL: well developed, well nourished,in no apparent distress  CARDIO: RRR, no murmurs  LUNGS: clear to ausculation bilaterally, no wheezing or rhonchi  GI: BS present x 4.  No hepatosplenomegaly.  : + suprapubic tenderness, bladder distention, or CVAT     No results found for this or any previous visit (from the past 24 hour(s)).      ASSESSMENT AND PLAN:   Arnie Kate is a 38 year old female presents with UTI symptoms.  Pt given Keflex 500 mg one bid x 7 days.  Patient/Parent has given us consent to send out a culture and understand that they will be contacted in 2-3 days with culture results.  The patient indicates understanding of these issues and agrees to the plan.  The patient is asked to Call if fever, vomiting, worsening symptoms.    Encounter Diagnosis   Name Primary?    Acute  UTI Yes       Orders Placed This Encounter   Procedures    Urine Dip, auto without Micro    Urine Culture, Routine [E]       Meds & Refills for this Visit:  Requested Prescriptions     Signed Prescriptions Disp Refills    cephalexin (KEFLEX) 500 MG Oral Cap 14 capsule 0     Sig: Take 1 capsule (500 mg total) by mouth 2 (two) times daily for 7 days.       Imaging & Consults:  None

## 2024-06-04 NOTE — TELEPHONE ENCOUNTER
Patient has appt with Dr Rodriguez on 6/14, requesting annual lab work to [facility]. Oended orders for your review.     S/w patient regarding urinary symptoms and scheduled in office appt today with NIDHI.

## 2024-06-04 NOTE — TELEPHONE ENCOUNTER
Future Appointments   Date Time Provider Department Center   6/14/2024 11:00 AM Estevan Rodriguez MD [facility] [place]     Patient scheduled CPX and is requesting routine lab orders. Patient is also requesting an order for urinalysis as she has been having UTI sxs for 2 days. Lower abdominal pain, frequency and discomfort with urination.   Also requesting Lipid panel to check cholesterol following birth of children.  Please place orders for [facility].

## 2024-06-14 ENCOUNTER — OFFICE VISIT (OUTPATIENT)
Dept: INTERNAL MEDICINE CLINIC | Facility: CLINIC | Age: 39
End: 2024-06-14
Payer: COMMERCIAL

## 2024-06-14 ENCOUNTER — LAB ENCOUNTER (OUTPATIENT)
Dept: LAB | Age: 39
End: 2024-06-14
Attending: FAMILY MEDICINE
Payer: COMMERCIAL

## 2024-06-14 VITALS
WEIGHT: 108 LBS | HEART RATE: 101 BPM | HEIGHT: 59 IN | TEMPERATURE: 98 F | OXYGEN SATURATION: 98 % | SYSTOLIC BLOOD PRESSURE: 115 MMHG | BODY MASS INDEX: 21.77 KG/M2 | DIASTOLIC BLOOD PRESSURE: 65 MMHG

## 2024-06-14 DIAGNOSIS — Z00.00 WELLNESS EXAMINATION: Primary | ICD-10-CM

## 2024-06-14 DIAGNOSIS — Z00.00 WELLNESS EXAMINATION: ICD-10-CM

## 2024-06-14 DIAGNOSIS — Z00.00 LABORATORY EXAM ORDERED AS PART OF ROUTINE GENERAL MEDICAL EXAMINATION: ICD-10-CM

## 2024-06-14 LAB
ALBUMIN SERPL-MCNC: 4.7 G/DL (ref 3.2–4.8)
ALBUMIN/GLOB SERPL: 1.7 {RATIO} (ref 1–2)
ALP LIVER SERPL-CCNC: 52 U/L
ALT SERPL-CCNC: 15 U/L
ANION GAP SERPL CALC-SCNC: 9 MMOL/L (ref 0–18)
AST SERPL-CCNC: 21 U/L (ref ?–34)
BASOPHILS # BLD AUTO: 0.05 X10(3) UL (ref 0–0.2)
BASOPHILS NFR BLD AUTO: 0.7 %
BILIRUB SERPL-MCNC: 1.2 MG/DL (ref 0.3–1.2)
BUN BLD-MCNC: 13 MG/DL (ref 9–23)
BUN/CREAT SERPL: 16 (ref 10–20)
CALCIUM BLD-MCNC: 9.5 MG/DL (ref 8.7–10.4)
CHLORIDE SERPL-SCNC: 107 MMOL/L (ref 98–112)
CHOLEST SERPL-MCNC: 197 MG/DL (ref ?–200)
CO2 SERPL-SCNC: 24 MMOL/L (ref 21–32)
CREAT BLD-MCNC: 0.81 MG/DL
DEPRECATED RDW RBC AUTO: 41.7 FL (ref 35.1–46.3)
EGFRCR SERPLBLD CKD-EPI 2021: 95 ML/MIN/1.73M2 (ref 60–?)
EOSINOPHIL # BLD AUTO: 0.08 X10(3) UL (ref 0–0.7)
EOSINOPHIL NFR BLD AUTO: 1.2 %
ERYTHROCYTE [DISTWIDTH] IN BLOOD BY AUTOMATED COUNT: 12.1 % (ref 11–15)
EST. AVERAGE GLUCOSE BLD GHB EST-MCNC: 103 MG/DL (ref 68–126)
FASTING PATIENT LIPID ANSWER: YES
FASTING STATUS PATIENT QL REPORTED: YES
GLOBULIN PLAS-MCNC: 2.8 G/DL (ref 2–3.5)
GLUCOSE BLD-MCNC: 83 MG/DL (ref 70–99)
HBA1C MFR BLD: 5.2 % (ref ?–5.7)
HCT VFR BLD AUTO: 37.7 %
HDLC SERPL-MCNC: 89 MG/DL (ref 40–59)
HGB BLD-MCNC: 12.9 G/DL
IMM GRANULOCYTES # BLD AUTO: 0.01 X10(3) UL (ref 0–1)
IMM GRANULOCYTES NFR BLD: 0.1 %
LDLC SERPL CALC-MCNC: 100 MG/DL (ref ?–100)
LYMPHOCYTES # BLD AUTO: 2.26 X10(3) UL (ref 1–4)
LYMPHOCYTES NFR BLD AUTO: 33.3 %
MCH RBC QN AUTO: 32.2 PG (ref 26–34)
MCHC RBC AUTO-ENTMCNC: 34.2 G/DL (ref 31–37)
MCV RBC AUTO: 94 FL
MONOCYTES # BLD AUTO: 0.49 X10(3) UL (ref 0.1–1)
MONOCYTES NFR BLD AUTO: 7.2 %
NEUTROPHILS # BLD AUTO: 3.89 X10 (3) UL (ref 1.5–7.7)
NEUTROPHILS # BLD AUTO: 3.89 X10(3) UL (ref 1.5–7.7)
NEUTROPHILS NFR BLD AUTO: 57.5 %
NONHDLC SERPL-MCNC: 108 MG/DL (ref ?–130)
OSMOLALITY SERPL CALC.SUM OF ELEC: 289 MOSM/KG (ref 275–295)
PLATELET # BLD AUTO: 312 10(3)UL (ref 150–450)
POTASSIUM SERPL-SCNC: 4 MMOL/L (ref 3.5–5.1)
PROT SERPL-MCNC: 7.5 G/DL (ref 5.7–8.2)
RBC # BLD AUTO: 4.01 X10(6)UL
SODIUM SERPL-SCNC: 140 MMOL/L (ref 136–145)
TRIGL SERPL-MCNC: 40 MG/DL (ref 30–149)
TSI SER-ACNC: 1.91 MIU/ML (ref 0.55–4.78)
VIT D+METAB SERPL-MCNC: 37.8 NG/ML (ref 30–100)
VLDLC SERPL CALC-MCNC: 7 MG/DL (ref 0–30)
WBC # BLD AUTO: 6.8 X10(3) UL (ref 4–11)

## 2024-06-14 PROCEDURE — 82306 VITAMIN D 25 HYDROXY: CPT | Performed by: FAMILY MEDICINE

## 2024-06-14 PROCEDURE — 3008F BODY MASS INDEX DOCD: CPT | Performed by: FAMILY MEDICINE

## 2024-06-14 PROCEDURE — 3074F SYST BP LT 130 MM HG: CPT | Performed by: FAMILY MEDICINE

## 2024-06-14 PROCEDURE — 83036 HEMOGLOBIN GLYCOSYLATED A1C: CPT | Performed by: FAMILY MEDICINE

## 2024-06-14 PROCEDURE — 80061 LIPID PANEL: CPT | Performed by: FAMILY MEDICINE

## 2024-06-14 PROCEDURE — 3078F DIAST BP <80 MM HG: CPT | Performed by: FAMILY MEDICINE

## 2024-06-14 PROCEDURE — 80050 GENERAL HEALTH PANEL: CPT | Performed by: FAMILY MEDICINE

## 2024-06-14 PROCEDURE — 99395 PREV VISIT EST AGE 18-39: CPT | Performed by: FAMILY MEDICINE

## 2024-06-14 RX ORDER — LABETALOL 200 MG/1
200 TABLET, FILM COATED ORAL 2 TIMES DAILY
COMMUNITY
Start: 2024-06-14

## 2024-06-14 NOTE — PROGRESS NOTES
HPI:   Arnie Kate is a 38 year old female who presents for a complete physical exam.     Wt Readings from Last 6 Encounters:   06/14/24 108 lb (49 kg)   06/04/24 111 lb 6.4 oz (50.5 kg)   03/09/23 110 lb 6.4 oz (50.1 kg)   06/13/22 105 lb 12.8 oz (48 kg)   04/04/21 136 lb (61.7 kg)   04/02/21 134 lb (60.8 kg)     Body mass index is 21.81 kg/m².     Cholesterol, Total (mg/dL)   Date Value   07/11/2019 252 (H)   09/28/2017 186   08/03/2011 183   11/18/2009 215 (H)     CHOLESTEROL, TOTAL (mg/dL)   Date Value   03/09/2023 216 (H)   01/07/2016 192     HDL Cholesterol (mg/dL)   Date Value   07/11/2019 119 (H)   09/28/2017 100   08/03/2011 93   11/18/2009 111     HDL CHOLESTEROL (mg/dL)   Date Value   03/09/2023 90   01/07/2016 106     LDL Cholesterol Calc (mg/dL)   Date Value   08/03/2011 78   11/18/2009 94     LDL Cholesterol (mg/dL)   Date Value   07/11/2019 116 (H)   09/28/2017 79     LDL-CHOLESTEROL (mg/dL (calc))   Date Value   03/09/2023 109 (H)   01/07/2016 72     Triglycerides (mg/dL)   Date Value   08/03/2011 62   11/18/2009 51     AST (SGOT) (IU/L)   Date Value   05/02/2011 16     AST (U/L)   Date Value   03/09/2023 16   04/04/2021 21   04/02/2021 27   12/10/2019 29   06/04/2019 21   12/09/2015 17     ALT (SGPT) (IU/L)   Date Value   05/02/2011 8     ALT (U/L)   Date Value   03/09/2023 10   04/04/2021 19   04/02/2021 24   12/10/2019 19   06/04/2019 30   12/09/2015 13        Current Outpatient Medications   Medication Sig Dispense Refill    labetalol 200 MG Oral Tab Take 1 tablet (200 mg total) by mouth 2 (two) times daily.      NIFEdipine ER 60 MG Oral Tablet 24 Hr Take 2 tablets (120 mg total) by mouth daily. (Patient taking differently: Take 1 tablet (60 mg total) by mouth daily.) 60 tablet 0    Ascorbic Acid (VITAMIN C) 1000 MG Oral Tab Take 1 tablet (1,000 mg total) by mouth daily.      acidophilus-pectin Oral Cap Take 1 capsule by mouth daily. (Patient not taking: Reported on 6/14/2024)       cholecalciferol 50 MCG (2000 UT) Oral Cap Take 1 capsule (2,000 Units total) by mouth daily.        Past Medical History:    Anemia    Anorexia    RESOLVED    Chronic rhinitis    Essential hypertension    Essential HTN     Former smoker    History of chicken pox    Ovarian cyst    Ovarian cyst    Pertussis      No past surgical history on file.   Family History   Problem Relation Age of Onset    Other Father         age 51    Hypertension Father     Lipids Mother     Psychiatric Mother         anxiety    Hypertension Mother     Other Mother         age 52    Breast Cancer Mother     Cancer Mother 42        breast    Other Maternal Grandfather         CVA    Heart Disorder Paternal Grandfather     Breast Cancer Other 45        dies    Cancer Other 45        breast      Social History:   Social History     Socioeconomic History    Marital status:    Tobacco Use    Smoking status: Former     Current packs/day: 0.10     Average packs/day: 0.1 packs/day for 3.0 years (0.3 ttl pk-yrs)     Types: Cigarettes    Smokeless tobacco: Former     Quit date: 2000    Tobacco comments:     2-3 cigarettes when out on weekends   Vaping Use    Vaping status: Never Used   Substance and Sexual Activity    Alcohol use: Not Currently     Comment: socially prior to pregnancy     Drug use: No    Sexual activity: Yes     Partners: Male     Comment: in relationship since 2010   Other Topics Concern    Caffeine Concern No    Exercise Yes     Comment: 5x/week.    Social History Narrative    ** Merged History Encounter **          Social Determinants of Health      Received from [facility], [facility]    Social Connections    Received from [facility], [facility]    Housing Stability     .        REVIEW OF SYSTEMS:   GENERAL: feels well otherwise  SKIN: denies rash  HEENT: denies nasal congestion, sinus pain or ST  LUNGS: denies shortness of breath  No  cough  CARDIOVASCULAR: denies chest pain on exertion  No palpitations    on nifedipine   [id number] /80-90      Dr Carney    GI: denies abdominal pain  : denies dysuria   NEURO: denies headaches  PSYCHE: denies depression or anxiety  HEMATOLOGIC: denies hx of anemia  ENDOCRINE: denies thyroid history  ALL/ASTHMA: denies hx of allergy or asthma    EXAM:   /65   Pulse 101   Temp 98.2 °F (36.8 °C) (Temporal)   Ht 4' 11\" (1.499 m)   Wt 108 lb (49 kg)   SpO2 98%   BMI 21.81 kg/m²   Body mass index is 21.81 kg/m².   GENERAL: well developed, well nourished,in no apparent distress  SKIN: no rashes   HEENT: atraumatic, normocephalic,ears and throat are clear  NECK: supple,no adenopathy  LUNGS: clear to auscultation  CARDIO: RRR without murmur  GI: good BS's,no masses, HSM or tenderness  EXTREMITIES: no edema  NEURO: motor and sensory are grossly intact    ASSESSMENT AND PLAN:   Arnie Kate is a 38 year old female who presents for a complete physical exam.  Health maintenance, will check fasting Lipids, CMP, UA, vit D A1c  and CBC.  BP good    The patient indicates understanding of these issues and agrees to the plan.  The patient is asked to return for CPX in 1yr.

## 2024-10-18 ENCOUNTER — HOSPITAL ENCOUNTER (OUTPATIENT)
Dept: BONE DENSITY | Age: 39
Discharge: HOME | End: 2024-10-18
Attending: OBSTETRICS & GYNECOLOGY
Payer: COMMERCIAL

## 2024-10-18 ENCOUNTER — HOSPITAL ENCOUNTER (OUTPATIENT)
Dept: MAMMOGRAPHY | Age: 39
Discharge: HOME | End: 2024-10-18
Attending: OBSTETRICS & GYNECOLOGY
Payer: COMMERCIAL

## 2024-10-18 DIAGNOSIS — Z12.31 ENCOUNTER FOR SCREENING MAMMOGRAM FOR MALIGNANT NEOPLASM OF BREAST: ICD-10-CM

## 2024-10-18 DIAGNOSIS — N95.1 SYMPTOMATIC MENOPAUSAL OR FEMALE CLIMACTERIC STATES: ICD-10-CM

## 2024-10-18 PROCEDURE — 77080 DXA BONE DENSITY AXIAL: CPT | Performed by: OBSTETRICS & GYNECOLOGY

## 2024-10-18 PROCEDURE — 77067 SCR MAMMO BI INCL CAD: CPT | Performed by: OBSTETRICS & GYNECOLOGY

## 2024-10-18 PROCEDURE — 77063 BREAST TOMOSYNTHESIS BI: CPT | Performed by: OBSTETRICS & GYNECOLOGY

## 2024-10-25 ENCOUNTER — HOSPITAL ENCOUNTER (OUTPATIENT)
Dept: CT IMAGING | Age: 39
Discharge: HOME | End: 2024-10-25
Attending: INTERNAL MEDICINE

## 2024-10-25 DIAGNOSIS — R07.9 CHEST PAIN: ICD-10-CM

## 2024-11-15 ENCOUNTER — HOSPITAL ENCOUNTER (OUTPATIENT)
Dept: MAMMOGRAPHY | Age: 39
Discharge: HOME | End: 2024-11-15
Attending: OBSTETRICS & GYNECOLOGY
Payer: COMMERCIAL

## 2024-11-15 ENCOUNTER — HOSPITAL ENCOUNTER (OUTPATIENT)
Dept: ULTRASOUND IMAGING | Age: 39
Discharge: HOME | End: 2024-11-15
Attending: OBSTETRICS & GYNECOLOGY
Payer: COMMERCIAL

## 2024-11-15 DIAGNOSIS — R92.2 INCONCLUSIVE MAMMOGRAM: ICD-10-CM

## 2024-11-15 PROCEDURE — 76642 ULTRASOUND BREAST LIMITED: CPT | Performed by: OBSTETRICS & GYNECOLOGY

## 2024-11-15 PROCEDURE — 77065 DX MAMMO INCL CAD UNI: CPT | Performed by: OBSTETRICS & GYNECOLOGY

## 2024-11-15 PROCEDURE — 77061 BREAST TOMOSYNTHESIS UNI: CPT | Performed by: OBSTETRICS & GYNECOLOGY

## 2024-12-23 ENCOUNTER — TELEPHONE (OUTPATIENT)
Dept: INTERNAL MEDICINE CLINIC | Facility: CLINIC | Age: 39
End: 2024-12-23

## 2024-12-23 NOTE — TELEPHONE ENCOUNTER
Spoke to patient who reports persistent migraine  for 7 days. Pain is located in periorbital area. Patient states she wakes up with the headache and it persists throughout the day.     Patient denies vision changes or paraesthesias.     Spoke to PCP who recommended patient proceed to ER to have her symptoms assessed.     Patient v/u and stated she will be proceeding to ER

## 2025-01-03 ENCOUNTER — TELEPHONE (OUTPATIENT)
Dept: INTERNAL MEDICINE CLINIC | Facility: CLINIC | Age: 40
End: 2025-01-03

## 2025-01-03 ENCOUNTER — HOSPITAL ENCOUNTER (OUTPATIENT)
Age: 40
Discharge: HOME | End: 2025-01-03
Attending: EMERGENCY MEDICINE
Payer: COMMERCIAL

## 2025-01-03 ENCOUNTER — APPOINTMENT (OUTPATIENT)
Dept: CT IMAGING | Age: 40
End: 2025-01-03
Attending: EMERGENCY MEDICINE
Payer: COMMERCIAL

## 2025-01-03 VITALS
SYSTOLIC BLOOD PRESSURE: 108 MMHG | OXYGEN SATURATION: 100 % | WEIGHT: 107 LBS | DIASTOLIC BLOOD PRESSURE: 73 MMHG | TEMPERATURE: 98 F | BODY MASS INDEX: 22 KG/M2 | HEART RATE: 16 BPM | RESPIRATION RATE: 73 BRPM

## 2025-01-03 DIAGNOSIS — G43.909 MIGRAINE WITHOUT STATUS MIGRAINOSUS, NOT INTRACTABLE, UNSPECIFIED MIGRAINE TYPE: Primary | ICD-10-CM

## 2025-01-03 LAB — B-HCG UR QL: NEGATIVE

## 2025-01-03 PROCEDURE — 81025 URINE PREGNANCY TEST: CPT

## 2025-01-03 PROCEDURE — 99204 OFFICE O/P NEW MOD 45 MIN: CPT

## 2025-01-03 PROCEDURE — 96374 THER/PROPH/DIAG INJ IV PUSH: CPT

## 2025-01-03 PROCEDURE — 70450 CT HEAD/BRAIN W/O DYE: CPT | Performed by: EMERGENCY MEDICINE

## 2025-01-03 PROCEDURE — 96375 TX/PRO/DX INJ NEW DRUG ADDON: CPT

## 2025-01-03 PROCEDURE — 99214 OFFICE O/P EST MOD 30 MIN: CPT

## 2025-01-03 RX ORDER — DIPHENHYDRAMINE HYDROCHLORIDE 50 MG/ML
25 INJECTION INTRAMUSCULAR; INTRAVENOUS ONCE
Status: COMPLETED | OUTPATIENT
Start: 2025-01-03 | End: 2025-01-03

## 2025-01-03 RX ORDER — ONDANSETRON 2 MG/ML
4 INJECTION INTRAMUSCULAR; INTRAVENOUS ONCE
Status: COMPLETED | OUTPATIENT
Start: 2025-01-03 | End: 2025-01-03

## 2025-01-03 RX ORDER — KETOROLAC TROMETHAMINE 30 MG/ML
30 INJECTION, SOLUTION INTRAMUSCULAR; INTRAVENOUS ONCE
Status: COMPLETED | OUTPATIENT
Start: 2025-01-03 | End: 2025-01-03

## 2025-01-03 RX ORDER — BUTALBITAL, ACETAMINOPHEN AND CAFFEINE 50; 325; 40 MG/1; MG/1; MG/1
1-2 TABLET ORAL EVERY 6 HOURS PRN
Qty: 10 TABLET | Refills: 0 | Status: SHIPPED | OUTPATIENT
Start: 2025-01-03 | End: 2025-01-08

## 2025-01-03 NOTE — ED INITIAL ASSESSMENT (HPI)
Pt c/o headache x5 days starting 12/20, improved x2 days then pain back, pt called md sending here, bp was high over the last few days taking a second dose of Labetalol,pt also c/o dull pain to L arm starting yesterday

## 2025-01-03 NOTE — TELEPHONE ENCOUNTER
Spoke with patient to triage. Pt has called on 12/23/24 and was told to go to the ER. Pt did not go to ER. Migraine and blood pressures have been elevated since. Last two days, pt started experiencing malaise and pain in her bones. Her BP was elevated yesterday at 154/104 at 3PM and at 6PM it was 165/119 with HR 80. Pt woke up and had a headache and her HR was 94, she took a second dose of labetalol. Pt woke up this morning and took her BP and now it's 105/63 HR 88. Her body temperature is 99. Pt doesn't feel like she has a cold, denies cough, but does have nausea. She tried to eat this morning but was feeling nauseous.     This RN recommended pt at least be evaluated in the UC as biggest concern are her elevated blood pressures along with her headaches- she could be at risk for a stroke with these two symptoms. If UC deems necessary, they may send her to the ER. Pt v/u and will have  take her to UC to be evaluated. Pt also scheduled a follow-up on 1/6/25 with Dr. Rodriguez.    TO: Pt going to UC for elevated BP and headache. Pt scheduled for UC f/u (15 min slot) on 1/6/25. OK to keep?     Future Appointments   Date Time Provider Department Center   1/6/2025  3:45 PM Estevan Rodriguez MD [facility] [place]

## 2025-01-03 NOTE — ED PROVIDER NOTES
Patient Seen in: [facility]      History     Chief Complaint   Patient presents with    Headache     Stated Complaint: Fatige, Migraine, Nausea, Pain in Arm, High Bloo Pressure    Subjective:   HPI    39-year-old female presents to the immediate care for complaints of a headache.  Patient states that she has a history of migraine headaches.  She states that she has been having a persistent headache since December 23.  She denies any fevers or chills.  She has complaints of light sensitivity and nausea.  She also noticed that her blood pressure has been elevated when she was at work.  Patient took an extra dose of labetalol today.  She called her primary care doctor who advised her to go to the emergency department several days ago but patient did not go.  She then called her primary care doctor today who then advised her to come to the urgent care.  She also complains of a feeling of numbness to her left arm but denies any weakness.  She denies any facial droop or slurred speech.  Denies any focal motor weakness.    Objective:     Past Medical History:    Anemia    Anorexia    RESOLVED    Chronic rhinitis    Essential hypertension    Essential HTN     Former smoker    History of chicken pox    Ovarian cyst    Ovarian cyst    Pertussis              History reviewed. No pertinent surgical history.             No pertinent social history.            Review of Systems    Positive for stated complaint: Fatige, Migraine, Nausea, Pain in Arm, High Bloo Pressure  Other systems are as noted in HPI.  Constitutional and vital signs reviewed.      All other systems reviewed and negative except as noted above.    Physical Exam     ED Triage Vitals   BP 01/03/25 1719 102/78   Pulse 01/03/25 1719 84   Resp 01/03/25 1719 16   Temp 01/03/25 1719 98.4 °F (36.9 °C)   Temp src 01/03/25 1719 Oral   SpO2 01/03/25 1719 99 %   O2 Device 01/03/25 1700 None (Room air)       Current Vitals:   Vital Signs  BP: 102/78  Pulse:  84  Resp: 16  Temp: 98.4 °F (36.9 °C)  Temp src: Oral    Oxygen Therapy  SpO2: 99 %  O2 Device: None (Room air)        Physical Exam  General: Alert and oriented. No acute distress.  HEENT: Normocephalic. No evidence of trauma. Extraocular movements are intact.  Cardiovascular exam: Regular rate and rhythm  Lungs: Clear to auscultation bilaterally.  Abdomen: Soft, nondistended, nontender.  Extremities: No evidence of deformity. No clubbing or cyanosis.  Neuro: No focal deficit is noted.    ED Course     Labs Reviewed   POCT PREGNANCY URINE - Normal     Patient was brought back to the examination room immediately.  The patient was then placed on a cardiac monitor and pulse oximetry was applied.  Patient had an IV established and labs were drawn.    The patient was administered IV Toradol, Benadryl, Zofran medications for the purposes of treating  [acute migraine headache].  The patient had good response to these medications.    Patient continued to be observed here in the emergency department.  Patient remained stable throughout the emergency department observation period.  Patient was reassessed after administration of IV Toradol Benadryl and Zofran.  She states that her headache is now resolved.  CT scan of the brain was negative for any acute finding on my independent review of the images.  Radiologist interpretation was reviewed.     VENTRICLES/SULCI:   Ventricles and sulci are normal in size.       INTRACRANIAL:  There are no abnormal extraaxial fluid collections.  There is no midline shift. No evidence of acute intracranial hemorrhage.     SINUSES:  No significant inflammatory changes.     MASTOIDS:  The mastoids are clear.     SKULL:  No depressed calvarial fracture.                  Impression  CONCLUSION:  No CT evidence for acute intracranial process.    Findings  of the patient's tests results were discussed with the patient in detail.  They were understanding of these results and their discharge  instructions.  All questions were answered.       MDM   Patient was screened and evaluated during this visit.   As a treating physician attending to the patient, I determined, within reasonable clinical confidence and prior to discharge, that an emergency medical condition was not or was no longer present.  There was no indication for further evaluation, treatment or admission on an emergency basis.  Comprehensive verbal and written discharge and follow-up instructions were provided to help prevent relapse or worsening.  Patient was instructed to follow-up with her primary care provider for further evaluation and treatment, but to return immediately to the ER for worsening, concerning, new, changing or persisting symptoms.  I discussed the case with the patient and they had no questions, complaints, or concerns.  Patient felt comfortable going home.    ^^Please note that this report has been produced using speech recognition software and may contain errors related to that system including, but not limited to, errors in grammar, punctuation, and spelling, as well as words and phrases that possibly may have been recognized inappropriately.  If there are any questions or concerns, contact the dictating provider for clarification        Medical Decision Making      Disposition and Plan     Clinical Impression:  1. Migraine without status migrainosus, not intractable, unspecified migraine type         Disposition:  Discharge  1/3/2025  7:03 pm    Follow-up:  Estevan Rodriguez MD  [address] [address] [phone]    Call   As needed, If symptoms worsen          Medications Prescribed:  Current Discharge Medication List        START taking these medications    Details   butalbital-acetaminophen-caffeine -40 MG Oral Tab Take 1-2 tablets by mouth every 6 (six) hours as needed for Pain.  Qty: 10 tablet, Refills: 0    Associated Diagnoses: Migraine without status migrainosus, not intractable,  unspecified migraine type                 Supplementary Documentation:

## 2025-01-04 NOTE — DISCHARGE INSTRUCTIONS
Follow up with your primary care doctor  Take fioricet as needed for headache  Return if any worsening symptoms or new concern

## 2025-03-11 ENCOUNTER — OFFICE VISIT (OUTPATIENT)
Dept: INTERNAL MEDICINE CLINIC | Facility: CLINIC | Age: 40
End: 2025-03-11
Payer: COMMERCIAL

## 2025-03-11 VITALS
DIASTOLIC BLOOD PRESSURE: 76 MMHG | TEMPERATURE: 98 F | SYSTOLIC BLOOD PRESSURE: 124 MMHG | HEIGHT: 59 IN | WEIGHT: 110 LBS | RESPIRATION RATE: 14 BRPM | BODY MASS INDEX: 22.18 KG/M2 | OXYGEN SATURATION: 96 % | HEART RATE: 81 BPM

## 2025-03-11 DIAGNOSIS — R31.9 URINARY TRACT INFECTION WITH HEMATURIA, SITE UNSPECIFIED: Primary | ICD-10-CM

## 2025-03-11 DIAGNOSIS — N39.0 URINARY TRACT INFECTION WITH HEMATURIA, SITE UNSPECIFIED: Primary | ICD-10-CM

## 2025-03-11 DIAGNOSIS — J01.90 ACUTE NON-RECURRENT SINUSITIS, UNSPECIFIED LOCATION: ICD-10-CM

## 2025-03-11 PROCEDURE — 87088 URINE BACTERIA CULTURE: CPT | Performed by: FAMILY MEDICINE

## 2025-03-11 PROCEDURE — 87086 URINE CULTURE/COLONY COUNT: CPT | Performed by: FAMILY MEDICINE

## 2025-03-11 PROCEDURE — 3078F DIAST BP <80 MM HG: CPT | Performed by: FAMILY MEDICINE

## 2025-03-11 PROCEDURE — 99213 OFFICE O/P EST LOW 20 MIN: CPT | Performed by: FAMILY MEDICINE

## 2025-03-11 PROCEDURE — 87186 SC STD MICRODIL/AGAR DIL: CPT | Performed by: FAMILY MEDICINE

## 2025-03-11 PROCEDURE — 3074F SYST BP LT 130 MM HG: CPT | Performed by: FAMILY MEDICINE

## 2025-03-11 PROCEDURE — 3008F BODY MASS INDEX DOCD: CPT | Performed by: FAMILY MEDICINE

## 2025-03-11 RX ORDER — CEFDINIR 300 MG/1
300 CAPSULE ORAL 2 TIMES DAILY
Qty: 20 CAPSULE | Refills: 0 | Status: SHIPPED | OUTPATIENT
Start: 2025-03-11

## 2025-03-11 NOTE — PROGRESS NOTES
CHIEF COMPLAINT:     Chief Complaint   Patient presents with    Urinary     Pt having UTI symptoms 1d.  C/o hematuria, dysuria, polyuria, and LLQ pain.    Cough     Pt also c/o lingering cough for 5d. Pt had ST for 2d but no longer present.  Pt has been afebrile.       HPI:   Arnie Kate is a 39 year old female who presents for upper respiratory symptoms for  5 days. Patient reports congestion, clear to green colored nasal discharge, dry cough, PND,raspy voice,headaches, denies fever. Symptoms have been not improving since onset.  Treating symptoms with fluids, coricidin,tylenol and motrin.   Associated symptoms include allergies.    Patient presents with symptoms of UTI. Pt has been taking AZO. Complaining of urinary frequency, urgency, dysuria and  hematuria. Denies back pain, and  fever.    Current Outpatient Medications   Medication Sig Dispense Refill    MAGNESIUM GLYCINATE OR Take 240 mg by mouth daily.      cefdinir 300 MG Oral Cap Take 1 capsule (300 mg total) by mouth 2 (two) times daily. 20 capsule 0    labetalol 200 MG Oral Tab Take 1 tablet (200 mg total) by mouth 2 (two) times daily.      acidophilus-pectin Oral Cap Take 1 capsule by mouth daily.      cholecalciferol 50 MCG (2000 UT) Oral Cap Take 1 capsule (2,000 Units total) by mouth daily.      NIFEdipine ER 60 MG Oral Tablet 24 Hr Take 2 tablets (120 mg total) by mouth daily. (Patient taking differently: Take 1 tablet (60 mg total) by mouth daily.) 60 tablet 0    Ascorbic Acid (VITAMIN C) 1000 MG Oral Tab Take 1 tablet (1,000 mg total) by mouth as needed.      Multiple Vitamin (MULTI-VITAMIN) Oral Tab multivitamin tablet, [RxNorm: 0] (Patient not taking: Reported on 3/11/2025)        Past Medical History:    Anemia    Anorexia    RESOLVED    Chronic rhinitis    Essential hypertension    Essential HTN     Former smoker    History of chicken pox    Osteopenia    Ovarian cyst    Ovarian cyst    Pertussis      History reviewed. No pertinent  surgical history.      Social History     Socioeconomic History    Marital status:    Tobacco Use    Smoking status: Former     Current packs/day: 0.10     Average packs/day: 0.1 packs/day for 3.0 years (0.3 ttl pk-yrs)     Types: Cigarettes    Smokeless tobacco: Former     Quit date: 2000    Tobacco comments:     2-3 cigarettes when out on weekends   Vaping Use    Vaping status: Never Used   Substance and Sexual Activity    Alcohol use: Yes     Comment: occasional    Drug use: No    Sexual activity: Yes     Partners: Male     Comment: in relationship since 2010   Other Topics Concern    Caffeine Concern No    Exercise Yes     Comment: 5x/week.    Social History Narrative    ** Merged History Encounter **          Social Drivers of Health      [facility] from [facility], [facility]    Housing Stability         REVIEW OF SYSTEMS:   GENERAL: feels well otherwise,  dcreased appetite  SKIN: no rashes or abnormal skin lesions  HEENT: See HPI  LUNGS: denies shortness of breath or wheezing, See HPI  CARDIOVASCULAR: denies chest pain or palpitations   GI: denies N/V/C or abdominal pain  NEURO: Denies headaches    EXAM:   /76   Pulse 81   Temp 98.1 °F (36.7 °C) (Temporal)   Resp 14   Ht 4' 11\" (1.499 m)   Wt 110 lb (49.9 kg)   LMP 03/01/2025 (Exact Date)   SpO2 96%   BMI 22.22 kg/m²   GENERAL: well developed, well nourished,in no apparent distress  SKIN: no rashes,no suspicious lesions  HEAD: atraumatic, normocephalic.  + pressure on palpation of maxillary or frontal sinuses  EYES: conjunctiva clear, EOM intact  EARS: TM's gray, no bulging, no retraction,no fluid  NOSE: Nostrils patent, cloudy nasal discharge, nasal mucosa redden  THROAT: Oral mucosa pink, moist. Posterior pharynx is + erythematous. PND,no exudates.     NECK: Supple, non-tender  LUNGS: clear to auscultation bilaterally, no wheezes or rhonchi. Breathing is non labored.  CARDIO: RRR without murmur  GI:  active BS's x4,no masses, hepatosplenomegaly, or tenderness on direct palpation. No suprapubic pressure or flank pain  EXTREMITIES: no cyanosis, clubbing or edema  LYMPH:  no lymphadenopathy.        ASSESSMENT AND PLAN:   Arnie Kate is a 39 year old female who presents with   Encounter Diagnoses   Name Primary?    Urinary tract infection with hematuria, site unspecified Yes    Acute non-recurrent sinusitis, unspecified location        Orders Placed This Encounter   Procedures    Urine Culture, Routine [E]       Meds & Refills for this Visit:  Requested Prescriptions     Signed Prescriptions Disp Refills    cefdinir 300 MG Oral Cap 20 capsule 0     Sig: Take 1 capsule (300 mg total) by mouth 2 (two) times daily.       Imaging & Consults:  None    1. Urinary tract infection with hematuria, site unspecified  Plan is to start Omnicef.   May continue AZo for one more day  Instructions given on increasing fluid intake,and rest  Patient/Parent has given us consent to send out a culture and understand that they will be contacted in 2-3 days with culture results.    - cefdinir 300 MG Oral Cap; Take 1 capsule (300 mg total) by mouth 2 (two) times daily.  Dispense: 20 capsule; Refill: 0    2. Acute non-recurrent sinusitis, unspecified location  - rest, fluids, coricidin,flonase .  - start antibiotics and take with food.  - cefdinir 300 MG Oral Cap; Take 1 capsule (300 mg total) by mouth 2 (two) times daily.  Dispense: 20 capsule; Refill: 0    The patient indicates understanding of these issues and agrees to the plan.  The patient is asked to return if sx's persist or worsen.

## 2025-03-14 ENCOUNTER — TELEPHONE (OUTPATIENT)
Dept: INTERNAL MEDICINE CLINIC | Facility: CLINIC | Age: 40
End: 2025-03-14

## 2025-03-14 NOTE — TELEPHONE ENCOUNTER
Pt is still experiencing dysuria and is wondering what else can be done. Did start meds on 3/11 evening prescribed at last visit with SM.    Would also like info on her latest labs.

## 2025-03-14 NOTE — TELEPHONE ENCOUNTER
SM: Please see below. Patient still having symptoms 2 days into cefdinir. Sensitivity report is back, stay on cefdinir or switch? Please advise. TY    Slightly better, frequency better, abdominal discomfort continues. Has history of cystitis. Denies fever, denies flank pain.

## 2025-03-15 RX ORDER — CIPROFLOXACIN 250 MG/1
250 TABLET, FILM COATED ORAL 2 TIMES DAILY
Qty: 14 TABLET | Refills: 0 | Status: SHIPPED | OUTPATIENT
Start: 2025-03-15 | End: 2025-03-22

## (undated) NOTE — ED AVS SNAPSHOT
Mariaelena Kate   MRN: [id number]    Department:  [facility] Emergency Department   Date of Visit:  7/5/2018           Disclosure     Insurance plans vary and the physician(s) referred by the ER may not be covered by your plan. CARE PHYSICIAN AT ONCE OR RETURN IMMEDIATELY TO THE EMERGENCY DEPARTMENT. If you have been prescribed any medication(s), please fill your prescription right away and begin taking the medication(s) as directed.   If you believe that any of the medications

## (undated) NOTE — ED AVS SNAPSHOT
Deya Kate   MRN: [id number]    Department:  [facility] Emergency Department   Date of Visit:  7/4/2018           Disclosure     Insurance plans vary and the physician(s) referred by the ER may not be covered by your plan. CARE PHYSICIAN AT ONCE OR RETURN IMMEDIATELY TO THE EMERGENCY DEPARTMENT. If you have been prescribed any medication(s), please fill your prescription right away and begin taking the medication(s) as directed.   If you believe that any of the medications

## (undated) NOTE — MR AVS SNAPSHOT
[place]  [address] [facility] [address] [facility] [address]  [address] [facility] [id number] [phone]               Thank you for choosing us for your health care visit with Janel Gonzalez MD.  We are glad to serve you and happy to provide you with this sum Summaries. If you've been to the Emergency Department or your doctor's office, you can view your past visit information in [place]  by going to Visits < Visit Summaries. [place]  questions? Call [phone] for help.   [place]  is NOT to be used for urge